# Patient Record
Sex: FEMALE | Race: WHITE | NOT HISPANIC OR LATINO | Employment: OTHER | URBAN - METROPOLITAN AREA
[De-identification: names, ages, dates, MRNs, and addresses within clinical notes are randomized per-mention and may not be internally consistent; named-entity substitution may affect disease eponyms.]

---

## 2017-02-02 ENCOUNTER — APPOINTMENT (OUTPATIENT)
Dept: PREADMISSION TESTING | Facility: HOSPITAL | Age: 82
End: 2017-02-02

## 2017-02-02 VITALS — WEIGHT: 162.04 LBS | HEIGHT: 62 IN | BODY MASS INDEX: 29.82 KG/M2

## 2017-02-02 LAB
ABO GROUP BLD: NORMAL
ANION GAP SERPL CALCULATED.3IONS-SCNC: 3 MMOL/L (ref 3–11)
BACTERIA UR QL AUTO: ABNORMAL /HPF
BILIRUB UR QL STRIP: NEGATIVE
BILIRUB UR QL STRIP: NEGATIVE
BLD GP AB SCN SERPL QL: NEGATIVE
BUN BLD-MCNC: 24 MG/DL (ref 9–23)
BUN/CREAT SERPL: 24 (ref 7–25)
CALCIUM SPEC-SCNC: 10.3 MG/DL (ref 8.7–10.4)
CHLORIDE SERPL-SCNC: 100 MMOL/L (ref 99–109)
CLARITY UR: CLEAR
CLARITY UR: CLEAR
CO2 SERPL-SCNC: 33 MMOL/L (ref 20–31)
COLOR UR: YELLOW
COLOR UR: YELLOW
CREAT BLD-MCNC: 1 MG/DL (ref 0.6–1.3)
DEPRECATED RDW RBC AUTO: 49.9 FL (ref 37–54)
ERYTHROCYTE [DISTWIDTH] IN BLOOD BY AUTOMATED COUNT: 13.9 % (ref 11.3–14.5)
GFR SERPL CREATININE-BSD FRML MDRD: 53 ML/MIN/1.73
GLUCOSE BLD-MCNC: 66 MG/DL (ref 70–100)
GLUCOSE UR STRIP-MCNC: NEGATIVE MG/DL
GLUCOSE UR STRIP-MCNC: NEGATIVE MG/DL
HBA1C MFR BLD: 5.9 % (ref 4.8–5.6)
HCT VFR BLD AUTO: 43.2 % (ref 34.5–44)
HGB BLD-MCNC: 14.2 G/DL (ref 11.5–15.5)
HGB UR QL STRIP.AUTO: NEGATIVE
HGB UR QL STRIP.AUTO: NEGATIVE
HYALINE CASTS UR QL AUTO: ABNORMAL /LPF
KETONES UR QL STRIP: NEGATIVE
KETONES UR QL STRIP: NEGATIVE
LEUKOCYTE ESTERASE UR QL STRIP.AUTO: ABNORMAL
LEUKOCYTE ESTERASE UR QL STRIP.AUTO: NEGATIVE
MCH RBC QN AUTO: 32.1 PG (ref 27–31)
MCHC RBC AUTO-ENTMCNC: 32.9 G/DL (ref 32–36)
MCV RBC AUTO: 97.7 FL (ref 80–99)
NITRITE UR QL STRIP: NEGATIVE
NITRITE UR QL STRIP: NEGATIVE
PH UR STRIP.AUTO: 5.5 [PH] (ref 5–8)
PH UR STRIP.AUTO: <=5 [PH] (ref 5–8)
PLATELET # BLD AUTO: 213 10*3/MM3 (ref 150–450)
PMV BLD AUTO: 10.5 FL (ref 6–12)
POTASSIUM BLD-SCNC: 4.6 MMOL/L (ref 3.5–5.5)
PROT UR QL STRIP: NEGATIVE
PROT UR QL STRIP: NEGATIVE
RBC # BLD AUTO: 4.42 10*6/MM3 (ref 3.89–5.14)
RBC # UR: ABNORMAL /HPF
REF LAB TEST METHOD: ABNORMAL
RH BLD: POSITIVE
SODIUM BLD-SCNC: 136 MMOL/L (ref 132–146)
SP GR UR STRIP: 1.01 (ref 1–1.03)
SP GR UR STRIP: 1.01 (ref 1–1.03)
SQUAMOUS #/AREA URNS HPF: ABNORMAL /HPF
UROBILINOGEN UR QL STRIP: ABNORMAL
UROBILINOGEN UR QL STRIP: NORMAL
WBC NRBC COR # BLD: 5.68 10*3/MM3 (ref 3.5–10.8)
WBC UR QL AUTO: ABNORMAL /HPF

## 2017-02-02 PROCEDURE — 86901 BLOOD TYPING SEROLOGIC RH(D): CPT

## 2017-02-02 PROCEDURE — 93010 ELECTROCARDIOGRAM REPORT: CPT | Performed by: INTERNAL MEDICINE

## 2017-02-02 PROCEDURE — 81001 URINALYSIS AUTO W/SCOPE: CPT | Performed by: ORTHOPAEDIC SURGERY

## 2017-02-02 PROCEDURE — 86900 BLOOD TYPING SEROLOGIC ABO: CPT

## 2017-02-02 PROCEDURE — 36415 COLL VENOUS BLD VENIPUNCTURE: CPT

## 2017-02-02 PROCEDURE — 83036 HEMOGLOBIN GLYCOSYLATED A1C: CPT | Performed by: ORTHOPAEDIC SURGERY

## 2017-02-02 PROCEDURE — 80048 BASIC METABOLIC PNL TOTAL CA: CPT | Performed by: ORTHOPAEDIC SURGERY

## 2017-02-02 PROCEDURE — 93005 ELECTROCARDIOGRAM TRACING: CPT

## 2017-02-02 PROCEDURE — 86850 RBC ANTIBODY SCREEN: CPT

## 2017-02-02 PROCEDURE — 81003 URINALYSIS AUTO W/O SCOPE: CPT | Performed by: ORTHOPAEDIC SURGERY

## 2017-02-02 PROCEDURE — 85027 COMPLETE CBC AUTOMATED: CPT | Performed by: ORTHOPAEDIC SURGERY

## 2017-02-02 RX ORDER — TOLTERODINE TARTRATE 2 MG/1
2 TABLET, EXTENDED RELEASE ORAL DAILY
COMMUNITY

## 2017-02-02 RX ORDER — LEVOTHYROXINE SODIUM 112 UG/1
112 CAPSULE ORAL DAILY
COMMUNITY

## 2017-02-02 RX ORDER — ATORVASTATIN CALCIUM 40 MG/1
40 TABLET, FILM COATED ORAL DAILY
COMMUNITY

## 2017-02-02 RX ORDER — NYSTATIN 100000 U/G
1 CREAM TOPICAL 3 TIMES DAILY
COMMUNITY

## 2017-02-02 RX ORDER — AMIODARONE HYDROCHLORIDE 200 MG/1
100 TABLET ORAL DAILY
COMMUNITY

## 2017-02-02 RX ORDER — OMEPRAZOLE 20 MG/1
20 CAPSULE, DELAYED RELEASE ORAL AS NEEDED
COMMUNITY

## 2017-02-02 RX ORDER — OMEGA-3S/DHA/EPA/FISH OIL/D3 300MG-1000
400 CAPSULE ORAL DAILY
COMMUNITY

## 2017-02-02 RX ORDER — DOXYCYCLINE HYCLATE 50 MG/1
50 CAPSULE ORAL DAILY
COMMUNITY
End: 2017-02-21 | Stop reason: HOSPADM

## 2017-02-02 RX ORDER — BRIMONIDINE TARTRATE 0.15 %
1 DROPS OPHTHALMIC (EYE) 3 TIMES DAILY
COMMUNITY

## 2017-02-02 RX ORDER — HYDROCODONE BITARTRATE AND ACETAMINOPHEN 10; 325 MG/1; MG/1
1 TABLET ORAL 2 TIMES DAILY PRN
Status: ON HOLD | COMMUNITY
End: 2017-02-19

## 2017-02-02 NOTE — PAT
Patient states that dr farrell is aware of doxycycline 50 mg use for eye blepharitis. Pt has been taking this for years.

## 2017-02-02 NOTE — PAT
DR. MEAD NOTIFIED OF ABNORMAL CCUA, ORDERED CATH UA WITH CULTURE IF +. ALSO NOTIFIED HIM OF SORE ON PATIENTS RIGHT FRONT LEG TREATED W RECENT ANTIBIOTICS, HEALING, NO FURTHER ORDERS.

## 2017-02-16 ENCOUNTER — ANESTHESIA EVENT (OUTPATIENT)
Dept: PERIOP | Facility: HOSPITAL | Age: 82
End: 2017-02-16

## 2017-02-16 RX ORDER — FAMOTIDINE 10 MG/ML
20 INJECTION, SOLUTION INTRAVENOUS ONCE
Status: CANCELLED | OUTPATIENT
Start: 2017-02-16 | End: 2017-02-16

## 2017-02-17 ENCOUNTER — ANESTHESIA (OUTPATIENT)
Dept: PERIOP | Facility: HOSPITAL | Age: 82
End: 2017-02-17

## 2017-02-17 ENCOUNTER — APPOINTMENT (OUTPATIENT)
Dept: GENERAL RADIOLOGY | Facility: HOSPITAL | Age: 82
End: 2017-02-17

## 2017-02-17 ENCOUNTER — HOSPITAL ENCOUNTER (INPATIENT)
Facility: HOSPITAL | Age: 82
LOS: 4 days | Discharge: NURSING, REHAB OR LONG-TERM CARE | End: 2017-02-21
Attending: ORTHOPAEDIC SURGERY | Admitting: ORTHOPAEDIC SURGERY

## 2017-02-17 DIAGNOSIS — Z74.09 IMPAIRED FUNCTIONAL MOBILITY, BALANCE, GAIT, AND ENDURANCE: Primary | ICD-10-CM

## 2017-02-17 DIAGNOSIS — T84.038A: ICD-10-CM

## 2017-02-17 DIAGNOSIS — Z96.659: ICD-10-CM

## 2017-02-17 PROBLEM — I10 HTN (HYPERTENSION): Status: ACTIVE | Noted: 2017-02-17

## 2017-02-17 PROBLEM — R73.03 PREDIABETES: Status: ACTIVE | Noted: 2017-02-17

## 2017-02-17 PROBLEM — I48.91 A-FIB (HCC): Status: ACTIVE | Noted: 2017-02-17

## 2017-02-17 PROBLEM — Z96.652 STATUS POST REVISION OF TOTAL REPLACEMENT OF LEFT KNEE: Status: ACTIVE | Noted: 2017-02-17

## 2017-02-17 LAB
ABO GROUP BLD: NORMAL
BLD GP AB SCN SERPL QL: NEGATIVE
GLUCOSE BLDC GLUCOMTR-MCNC: 150 MG/DL (ref 70–130)
GLUCOSE BLDC GLUCOMTR-MCNC: 219 MG/DL (ref 70–130)
POTASSIUM BLDA-SCNC: 3.92 MMOL/L (ref 3.5–5.3)
RH BLD: POSITIVE

## 2017-02-17 PROCEDURE — C1776 JOINT DEVICE (IMPLANTABLE): HCPCS | Performed by: ORTHOPAEDIC SURGERY

## 2017-02-17 PROCEDURE — 25010000002 ROPIVACAINE PER 1 MG: Performed by: ORTHOPAEDIC SURGERY

## 2017-02-17 PROCEDURE — 87205 SMEAR GRAM STAIN: CPT | Performed by: ORTHOPAEDIC SURGERY

## 2017-02-17 PROCEDURE — 0SRD0J9 REPLACEMENT OF LEFT KNEE JOINT WITH SYNTHETIC SUBSTITUTE, CEMENTED, OPEN APPROACH: ICD-10-PCS | Performed by: ORTHOPAEDIC SURGERY

## 2017-02-17 PROCEDURE — 0SPD0JZ REMOVAL OF SYNTHETIC SUBSTITUTE FROM LEFT KNEE JOINT, OPEN APPROACH: ICD-10-PCS | Performed by: ORTHOPAEDIC SURGERY

## 2017-02-17 PROCEDURE — 82962 GLUCOSE BLOOD TEST: CPT

## 2017-02-17 PROCEDURE — 63710000001 INSULIN LISPRO (HUMAN) PER 5 UNITS: Performed by: NURSE PRACTITIONER

## 2017-02-17 PROCEDURE — 86900 BLOOD TYPING SEROLOGIC ABO: CPT

## 2017-02-17 PROCEDURE — 25010000003 CEFAZOLIN IN DEXTROSE 2-4 GM/100ML-% SOLUTION: Performed by: ORTHOPAEDIC SURGERY

## 2017-02-17 PROCEDURE — 25010000002 FENTANYL CITRATE (PF) 100 MCG/2ML SOLUTION: Performed by: NURSE ANESTHETIST, CERTIFIED REGISTERED

## 2017-02-17 PROCEDURE — 88331 PATH CONSLTJ SURG 1 BLK 1SPC: CPT | Performed by: ORTHOPAEDIC SURGERY

## 2017-02-17 PROCEDURE — C1713 ANCHOR/SCREW BN/BN,TIS/BN: HCPCS | Performed by: ORTHOPAEDIC SURGERY

## 2017-02-17 PROCEDURE — 25010000002 PROPOFOL 10 MG/ML EMULSION: Performed by: NURSE ANESTHETIST, CERTIFIED REGISTERED

## 2017-02-17 PROCEDURE — 25010000002 ONDANSETRON PER 1 MG: Performed by: NURSE ANESTHETIST, CERTIFIED REGISTERED

## 2017-02-17 PROCEDURE — 25010000002 HYDRALAZINE PER 20 MG: Performed by: NURSE ANESTHETIST, CERTIFIED REGISTERED

## 2017-02-17 PROCEDURE — 84132 ASSAY OF SERUM POTASSIUM: CPT | Performed by: ANESTHESIOLOGY

## 2017-02-17 PROCEDURE — 25010000002 ROPIVACAINE PER 1 MG: Performed by: NURSE ANESTHETIST, CERTIFIED REGISTERED

## 2017-02-17 PROCEDURE — 25010000003 MORPHINE PER 10 MG: Performed by: ORTHOPAEDIC SURGERY

## 2017-02-17 PROCEDURE — 25010000002 HYDROMORPHONE PER 4 MG: Performed by: ORTHOPAEDIC SURGERY

## 2017-02-17 PROCEDURE — 25010000002 DEXAMETHASONE PER 1 MG: Performed by: NURSE ANESTHETIST, CERTIFIED REGISTERED

## 2017-02-17 PROCEDURE — 88304 TISSUE EXAM BY PATHOLOGIST: CPT | Performed by: ORTHOPAEDIC SURGERY

## 2017-02-17 PROCEDURE — 73560 X-RAY EXAM OF KNEE 1 OR 2: CPT

## 2017-02-17 PROCEDURE — 87070 CULTURE OTHR SPECIMN AEROBIC: CPT | Performed by: ORTHOPAEDIC SURGERY

## 2017-02-17 PROCEDURE — 86901 BLOOD TYPING SEROLOGIC RH(D): CPT

## 2017-02-17 PROCEDURE — 87176 TISSUE HOMOGENIZATION CULTR: CPT | Performed by: ORTHOPAEDIC SURGERY

## 2017-02-17 PROCEDURE — 86850 RBC ANTIBODY SCREEN: CPT

## 2017-02-17 DEVICE — P.F.C. SIGMA DISTAL AUGMENT SIZE 3 4MM LEFT
Type: IMPLANTABLE DEVICE | Site: KNEE | Status: FUNCTIONAL
Brand: P.F.C. SIGMA

## 2017-02-17 DEVICE — SIGMA TIBIAL INSERT ROTATING PLATFORM TC3 SIZE 3 17.5MM GVF
Type: IMPLANTABLE DEVICE | Site: KNEE | Status: FUNCTIONAL
Brand: SIGMA

## 2017-02-17 DEVICE — P.F.C. SIGMA FEMORAL ADAPTER BOLT +2/-2
Type: IMPLANTABLE DEVICE | Site: KNEE | Status: FUNCTIONAL
Brand: P.F.C. SIGMA

## 2017-02-17 DEVICE — P.F.C. SIGMA FEMORAL ADAPTER 5 DEGREE
Type: IMPLANTABLE DEVICE | Site: KNEE | Status: FUNCTIONAL
Brand: P.F.C. SIGMA

## 2017-02-17 DEVICE — IMPLANTABLE DEVICE: Type: IMPLANTABLE DEVICE | Site: KNEE | Status: FUNCTIONAL

## 2017-02-17 DEVICE — SMARTSET GMV HIGH PERFORMANCE GENTAMICIN MEDIUM VISCOSITY BONE CEMENT 40G
Type: IMPLANTABLE DEVICE | Site: KNEE | Status: FUNCTIONAL
Brand: SMARTSET

## 2017-02-17 DEVICE — TIBIAL TRAY ROTATING PLATFORM M.B.T. REVISION SIZE 2.5 CEMENTED: Type: IMPLANTABLE DEVICE | Site: KNEE | Status: FUNCTIONAL

## 2017-02-17 DEVICE — P.F.C. SIGMA POSTERIOR AUGMENT COMBO CEMENTED SIZE 3 4MM
Type: IMPLANTABLE DEVICE | Site: KNEE | Status: FUNCTIONAL
Brand: P.F.C. SIGMA

## 2017-02-17 DEVICE — UNIVERSAL STEM FLUTED 75MM X 16MM: Type: IMPLANTABLE DEVICE | Site: KNEE | Status: FUNCTIONAL

## 2017-02-17 DEVICE — UNIVERSAL FEMORAL SLEEVE FULL POROUS 34MM: Type: IMPLANTABLE DEVICE | Site: KNEE | Status: FUNCTIONAL

## 2017-02-17 DEVICE — UNIVERSAL STEM FLUTED 75MM X 18MM: Type: IMPLANTABLE DEVICE | Site: KNEE | Status: FUNCTIONAL

## 2017-02-17 DEVICE — M.B.T. REVISION METAPHYSEAL SLEEVE POROUS 37MM: Type: IMPLANTABLE DEVICE | Site: KNEE | Status: FUNCTIONAL

## 2017-02-17 DEVICE — SIGMA FEMORAL TC3 CEMENTED 3 LEFT
Type: IMPLANTABLE DEVICE | Site: KNEE | Status: FUNCTIONAL
Brand: SIGMA

## 2017-02-17 RX ORDER — ONDANSETRON 2 MG/ML
4 INJECTION INTRAMUSCULAR; INTRAVENOUS ONCE AS NEEDED
Status: DISCONTINUED | OUTPATIENT
Start: 2017-02-17 | End: 2017-02-17 | Stop reason: HOSPADM

## 2017-02-17 RX ORDER — ATORVASTATIN CALCIUM 40 MG/1
40 TABLET, FILM COATED ORAL DAILY
Status: DISCONTINUED | OUTPATIENT
Start: 2017-02-17 | End: 2017-02-21 | Stop reason: HOSPADM

## 2017-02-17 RX ORDER — POLYETHYLENE GLYCOL 3350 17 G/17G
17 POWDER, FOR SOLUTION ORAL DAILY
Status: DISCONTINUED | OUTPATIENT
Start: 2017-02-17 | End: 2017-02-21 | Stop reason: HOSPADM

## 2017-02-17 RX ORDER — LEVOTHYROXINE SODIUM 112 UG/1
112 TABLET ORAL
Status: DISCONTINUED | OUTPATIENT
Start: 2017-02-18 | End: 2017-02-21 | Stop reason: HOSPADM

## 2017-02-17 RX ORDER — BUPIVACAINE HYDROCHLORIDE 2.5 MG/ML
INJECTION, SOLUTION EPIDURAL; INFILTRATION; INTRACAUDAL AS NEEDED
Status: DISCONTINUED | OUTPATIENT
Start: 2017-02-17 | End: 2017-02-17 | Stop reason: SURG

## 2017-02-17 RX ORDER — LIDOCAINE HYDROCHLORIDE 10 MG/ML
1 INJECTION, SOLUTION EPIDURAL; INFILTRATION; INTRACAUDAL; PERINEURAL ONCE
Status: COMPLETED | OUTPATIENT
Start: 2017-02-17 | End: 2017-02-17

## 2017-02-17 RX ORDER — CEFAZOLIN SODIUM 2 G/100ML
2 INJECTION, SOLUTION INTRAVENOUS ONCE
Status: COMPLETED | OUTPATIENT
Start: 2017-02-17 | End: 2017-02-17

## 2017-02-17 RX ORDER — LIDOCAINE HYDROCHLORIDE 10 MG/ML
INJECTION, SOLUTION INFILTRATION; PERINEURAL AS NEEDED
Status: DISCONTINUED | OUTPATIENT
Start: 2017-02-17 | End: 2017-02-17 | Stop reason: SURG

## 2017-02-17 RX ORDER — ACETAMINOPHEN 500 MG
1000 TABLET ORAL ONCE
Status: COMPLETED | OUTPATIENT
Start: 2017-02-17 | End: 2017-02-17

## 2017-02-17 RX ORDER — AMIODARONE HYDROCHLORIDE 200 MG/1
100 TABLET ORAL DAILY
Status: DISCONTINUED | OUTPATIENT
Start: 2017-02-17 | End: 2017-02-21 | Stop reason: HOSPADM

## 2017-02-17 RX ORDER — MAGNESIUM HYDROXIDE 1200 MG/15ML
LIQUID ORAL AS NEEDED
Status: DISCONTINUED | OUTPATIENT
Start: 2017-02-17 | End: 2017-02-17 | Stop reason: HOSPADM

## 2017-02-17 RX ORDER — ONDANSETRON 2 MG/ML
4 INJECTION INTRAMUSCULAR; INTRAVENOUS EVERY 6 HOURS PRN
Status: DISCONTINUED | OUTPATIENT
Start: 2017-02-17 | End: 2017-02-17

## 2017-02-17 RX ORDER — PROPOFOL 10 MG/ML
VIAL (ML) INTRAVENOUS CONTINUOUS PRN
Status: DISCONTINUED | OUTPATIENT
Start: 2017-02-17 | End: 2017-02-17 | Stop reason: SURG

## 2017-02-17 RX ORDER — FENTANYL CITRATE 50 UG/ML
50 INJECTION, SOLUTION INTRAMUSCULAR; INTRAVENOUS
Status: DISCONTINUED | OUTPATIENT
Start: 2017-02-17 | End: 2017-02-17 | Stop reason: HOSPADM

## 2017-02-17 RX ORDER — ONDANSETRON 4 MG/1
4 TABLET, FILM COATED ORAL EVERY 6 HOURS PRN
Status: DISCONTINUED | OUTPATIENT
Start: 2017-02-17 | End: 2017-02-17

## 2017-02-17 RX ORDER — OXYBUTYNIN CHLORIDE 5 MG/1
5 TABLET, EXTENDED RELEASE ORAL DAILY
Status: DISCONTINUED | OUTPATIENT
Start: 2017-02-17 | End: 2017-02-21 | Stop reason: HOSPADM

## 2017-02-17 RX ORDER — ROPIVACAINE HYDROCHLORIDE 2 MG/ML
10 INJECTION, SOLUTION EPIDURAL; INFILTRATION CONTINUOUS
Status: DISCONTINUED | OUTPATIENT
Start: 2017-02-17 | End: 2017-02-21 | Stop reason: HOSPADM

## 2017-02-17 RX ORDER — SODIUM CHLORIDE 0.9 % (FLUSH) 0.9 %
1-10 SYRINGE (ML) INJECTION AS NEEDED
Status: DISCONTINUED | OUTPATIENT
Start: 2017-02-17 | End: 2017-02-17 | Stop reason: HOSPADM

## 2017-02-17 RX ORDER — DEXAMETHASONE SODIUM PHOSPHATE 4 MG/ML
INJECTION, SOLUTION INTRA-ARTICULAR; INTRALESIONAL; INTRAMUSCULAR; INTRAVENOUS; SOFT TISSUE AS NEEDED
Status: DISCONTINUED | OUTPATIENT
Start: 2017-02-17 | End: 2017-02-17 | Stop reason: SURG

## 2017-02-17 RX ORDER — SENNA AND DOCUSATE SODIUM 50; 8.6 MG/1; MG/1
2 TABLET, FILM COATED ORAL 2 TIMES DAILY
Status: DISCONTINUED | OUTPATIENT
Start: 2017-02-17 | End: 2017-02-21 | Stop reason: HOSPADM

## 2017-02-17 RX ORDER — CEFAZOLIN SODIUM 2 G/100ML
2 INJECTION, SOLUTION INTRAVENOUS EVERY 8 HOURS
Status: COMPLETED | OUTPATIENT
Start: 2017-02-17 | End: 2017-02-18

## 2017-02-17 RX ORDER — DEXTROSE MONOHYDRATE 25 G/50ML
25 INJECTION, SOLUTION INTRAVENOUS
Status: DISCONTINUED | OUTPATIENT
Start: 2017-02-17 | End: 2017-02-21 | Stop reason: HOSPADM

## 2017-02-17 RX ORDER — PROPOFOL 10 MG/ML
VIAL (ML) INTRAVENOUS AS NEEDED
Status: DISCONTINUED | OUTPATIENT
Start: 2017-02-17 | End: 2017-02-17 | Stop reason: SURG

## 2017-02-17 RX ORDER — SODIUM CHLORIDE 9 MG/ML
100 INJECTION, SOLUTION INTRAVENOUS CONTINUOUS
Status: ACTIVE | OUTPATIENT
Start: 2017-02-17 | End: 2017-02-18

## 2017-02-17 RX ORDER — PROMETHAZINE HYDROCHLORIDE 25 MG/ML
12.5 INJECTION, SOLUTION INTRAMUSCULAR; INTRAVENOUS EVERY 6 HOURS PRN
Status: DISCONTINUED | OUTPATIENT
Start: 2017-02-17 | End: 2017-02-21 | Stop reason: HOSPADM

## 2017-02-17 RX ORDER — HYDRALAZINE HYDROCHLORIDE 20 MG/ML
INJECTION INTRAMUSCULAR; INTRAVENOUS AS NEEDED
Status: DISCONTINUED | OUTPATIENT
Start: 2017-02-17 | End: 2017-02-17 | Stop reason: SURG

## 2017-02-17 RX ORDER — LABETALOL HYDROCHLORIDE 5 MG/ML
INJECTION, SOLUTION INTRAVENOUS AS NEEDED
Status: DISCONTINUED | OUTPATIENT
Start: 2017-02-17 | End: 2017-02-17 | Stop reason: SURG

## 2017-02-17 RX ORDER — NALOXONE HCL 0.4 MG/ML
0.1 VIAL (ML) INJECTION
Status: DISCONTINUED | OUTPATIENT
Start: 2017-02-17 | End: 2017-02-21 | Stop reason: HOSPADM

## 2017-02-17 RX ORDER — ATRACURIUM BESYLATE 10 MG/ML
INJECTION, SOLUTION INTRAVENOUS AS NEEDED
Status: DISCONTINUED | OUTPATIENT
Start: 2017-02-17 | End: 2017-02-17 | Stop reason: SURG

## 2017-02-17 RX ORDER — FAMOTIDINE 20 MG/1
20 TABLET, FILM COATED ORAL ONCE
Status: COMPLETED | OUTPATIENT
Start: 2017-02-17 | End: 2017-02-17

## 2017-02-17 RX ORDER — ESMOLOL HYDROCHLORIDE 10 MG/ML
INJECTION INTRAVENOUS AS NEEDED
Status: DISCONTINUED | OUTPATIENT
Start: 2017-02-17 | End: 2017-02-17 | Stop reason: SURG

## 2017-02-17 RX ORDER — FENTANYL CITRATE 50 UG/ML
INJECTION, SOLUTION INTRAMUSCULAR; INTRAVENOUS AS NEEDED
Status: DISCONTINUED | OUTPATIENT
Start: 2017-02-17 | End: 2017-02-17 | Stop reason: SURG

## 2017-02-17 RX ORDER — HYDROCODONE BITARTRATE AND ACETAMINOPHEN 7.5; 325 MG/1; MG/1
1 TABLET ORAL EVERY 4 HOURS PRN
Status: DISCONTINUED | OUTPATIENT
Start: 2017-02-17 | End: 2017-02-21 | Stop reason: HOSPADM

## 2017-02-17 RX ORDER — SODIUM CHLORIDE, SODIUM LACTATE, POTASSIUM CHLORIDE, CALCIUM CHLORIDE 600; 310; 30; 20 MG/100ML; MG/100ML; MG/100ML; MG/100ML
9 INJECTION, SOLUTION INTRAVENOUS CONTINUOUS
Status: DISCONTINUED | OUTPATIENT
Start: 2017-02-17 | End: 2017-02-21 | Stop reason: HOSPADM

## 2017-02-17 RX ORDER — NICOTINE POLACRILEX 4 MG
15 LOZENGE BUCCAL
Status: DISCONTINUED | OUTPATIENT
Start: 2017-02-17 | End: 2017-02-21 | Stop reason: HOSPADM

## 2017-02-17 RX ORDER — PANTOPRAZOLE SODIUM 40 MG/1
40 TABLET, DELAYED RELEASE ORAL
Status: DISCONTINUED | OUTPATIENT
Start: 2017-02-18 | End: 2017-02-21 | Stop reason: HOSPADM

## 2017-02-17 RX ORDER — ONDANSETRON 2 MG/ML
INJECTION INTRAMUSCULAR; INTRAVENOUS AS NEEDED
Status: DISCONTINUED | OUTPATIENT
Start: 2017-02-17 | End: 2017-02-17 | Stop reason: SURG

## 2017-02-17 RX ORDER — PREGABALIN 75 MG/1
75 CAPSULE ORAL ONCE
Status: COMPLETED | OUTPATIENT
Start: 2017-02-17 | End: 2017-02-17

## 2017-02-17 RX ADMIN — LIDOCAINE HYDROCHLORIDE 0.4 ML: 10 INJECTION, SOLUTION EPIDURAL; INFILTRATION; INTRACAUDAL; PERINEURAL at 10:24

## 2017-02-17 RX ADMIN — HYDRALAZINE HYDROCHLORIDE 2 MG: 20 INJECTION INTRAMUSCULAR; INTRAVENOUS at 12:12

## 2017-02-17 RX ADMIN — LABETALOL HYDROCHLORIDE 5 MG: 5 INJECTION, SOLUTION INTRAVENOUS at 11:45

## 2017-02-17 RX ADMIN — BUPIVACAINE HYDROCHLORIDE 15 ML: 2.5 INJECTION, SOLUTION EPIDURAL; INFILTRATION; INTRACAUDAL; PERINEURAL at 15:56

## 2017-02-17 RX ADMIN — SODIUM CHLORIDE 100 ML/HR: 9 INJECTION, SOLUTION INTRAVENOUS at 18:16

## 2017-02-17 RX ADMIN — FENTANYL CITRATE 50 MCG: 50 INJECTION, SOLUTION INTRAMUSCULAR; INTRAVENOUS at 12:03

## 2017-02-17 RX ADMIN — LIDOCAINE HYDROCHLORIDE 50 MG: 10 INJECTION, SOLUTION INFILTRATION; PERINEURAL at 10:53

## 2017-02-17 RX ADMIN — INSULIN LISPRO 3 UNITS: 100 INJECTION, SOLUTION INTRAVENOUS; SUBCUTANEOUS at 21:29

## 2017-02-17 RX ADMIN — ATRACURIUM BESYLATE 40 MG: 10 INJECTION, SOLUTION INTRAVENOUS at 10:53

## 2017-02-17 RX ADMIN — ROPIVACAINE HYDROCHLORIDE 10 ML/HR: 2 INJECTION, SOLUTION EPIDURAL; INFILTRATION at 16:23

## 2017-02-17 RX ADMIN — ESMOLOL HYDROCHLORIDE 30 MG: 10 INJECTION, SOLUTION INTRAVENOUS at 10:53

## 2017-02-17 RX ADMIN — PROPOFOL 100 MG: 10 INJECTION, EMULSION INTRAVENOUS at 10:53

## 2017-02-17 RX ADMIN — ONDANSETRON 4 MG: 2 INJECTION INTRAMUSCULAR; INTRAVENOUS at 15:11

## 2017-02-17 RX ADMIN — HYDROCODONE BITARTRATE AND ACETAMINOPHEN 1 TABLET: 7.5; 325 TABLET ORAL at 20:52

## 2017-02-17 RX ADMIN — SODIUM CHLORIDE, POTASSIUM CHLORIDE, SODIUM LACTATE AND CALCIUM CHLORIDE 9 ML/HR: 600; 310; 30; 20 INJECTION, SOLUTION INTRAVENOUS at 10:01

## 2017-02-17 RX ADMIN — EPHEDRINE SULFATE 10 MG: 50 INJECTION INTRAMUSCULAR; INTRAVENOUS; SUBCUTANEOUS at 14:00

## 2017-02-17 RX ADMIN — PREGABALIN 75 MG: 75 CAPSULE ORAL at 10:23

## 2017-02-17 RX ADMIN — EPHEDRINE SULFATE 10 MG: 50 INJECTION INTRAMUSCULAR; INTRAVENOUS; SUBCUTANEOUS at 11:05

## 2017-02-17 RX ADMIN — CEFAZOLIN SODIUM 2 G: 2 INJECTION, SOLUTION INTRAVENOUS at 10:51

## 2017-02-17 RX ADMIN — TRANEXAMIC ACID 735 MG: 100 INJECTION, SOLUTION INTRAVENOUS at 11:30

## 2017-02-17 RX ADMIN — ACETAMINOPHEN 1000 MG: 500 TABLET, FILM COATED ORAL at 10:24

## 2017-02-17 RX ADMIN — EPHEDRINE SULFATE 5 MG: 50 INJECTION INTRAMUSCULAR; INTRAVENOUS; SUBCUTANEOUS at 11:10

## 2017-02-17 RX ADMIN — PROPOFOL 25 MCG/KG/MIN: 10 INJECTION, EMULSION INTRAVENOUS at 10:53

## 2017-02-17 RX ADMIN — LABETALOL HYDROCHLORIDE 5 MG: 5 INJECTION, SOLUTION INTRAVENOUS at 11:51

## 2017-02-17 RX ADMIN — FENTANYL CITRATE 50 MCG: 50 INJECTION, SOLUTION INTRAMUSCULAR; INTRAVENOUS at 11:39

## 2017-02-17 RX ADMIN — HYDROMORPHONE HYDROCHLORIDE 0.5 MG: 1 INJECTION, SOLUTION INTRAMUSCULAR; INTRAVENOUS; SUBCUTANEOUS at 21:17

## 2017-02-17 RX ADMIN — BUPIVACAINE HYDROCHLORIDE 15 ML: 2.5 INJECTION, SOLUTION EPIDURAL; INFILTRATION; INTRACAUDAL; PERINEURAL at 10:55

## 2017-02-17 RX ADMIN — FAMOTIDINE 20 MG: 20 TABLET ORAL at 10:23

## 2017-02-17 RX ADMIN — TRANEXAMIC ACID 735 MG: 100 INJECTION, SOLUTION INTRAVENOUS at 14:45

## 2017-02-17 RX ADMIN — EPHEDRINE SULFATE 10 MG: 50 INJECTION INTRAMUSCULAR; INTRAVENOUS; SUBCUTANEOUS at 13:46

## 2017-02-17 RX ADMIN — CEFAZOLIN SODIUM 2 G: 2 INJECTION, SOLUTION INTRAVENOUS at 21:17

## 2017-02-17 RX ADMIN — LABETALOL HYDROCHLORIDE 5 MG: 5 INJECTION, SOLUTION INTRAVENOUS at 12:03

## 2017-02-17 RX ADMIN — DEXAMETHASONE SODIUM PHOSPHATE 8 MG: 4 INJECTION, SOLUTION INTRAMUSCULAR; INTRAVENOUS at 10:53

## 2017-02-17 NOTE — ANESTHESIA PROCEDURE NOTES
Airway  Urgency: elective    Airway not difficult    General Information and Staff    Patient location during procedure: OR  CRNA: DOC FOOTE    Indications and Patient Condition  Indications for airway management: airway protection    Preoxygenated: yes  MILS not maintained throughout  Mask difficulty assessment: 1 - vent by mask    Final Airway Details  Final airway type: endotracheal airway      Successful airway: ETT  Cuffed: yes   Successful intubation technique: direct laryngoscopy  Endotracheal tube insertion site: oral  Blade: Neha  Blade size: #3  ETT size: 6.5 mm  Cormack-Lehane Classification: grade I - full view of glottis  Placement verified by: chest auscultation and capnometry   Cuff volume (mL): 6  Measured from: lips  ETT to lips (cm): 20  Number of attempts at approach: 1    Additional Comments  Negative epigastric sounds, Breath sound equal bilaterally with symmetric chest rise and fall. Atraumatic, no damage to lips or teeth during intubation

## 2017-02-17 NOTE — OP NOTE
DATE OF PROCEDURE:  02/17/2017    PREOPERATIVE DIAGNOSIS: Failed left total knee arthroplasty, loose tibial component.    POSTOPERATIVE DIAGNOSIS: Failed left total knee arthroplasty, loose tibial component.    PROCEDURE PERFORMED: Revision of left total knee arthroplasty, both components.     SURGEON: Simon Colvin MD    ANESTHESIA: General.     ASSISTANT: YUE Quezada    ESTIMATED BLOOD LOSS: 100 mL.     COMPLICATIONS: None.     SPECIMENS: Tissue was sent for frozen section, which showed no evidence of acute inflammation. Tissue was sent for culture as well.     INDICATIONS: The patient is an 84-year-old female with a long history of left knee pain and functional limitations. She is status post left total knee arthroplasty greater than 10 years ago in [place]. X-rays showed evidence of a subsided and loose tibial component. She had varus and valgus instability on exam. I had recommended revision many years ago, but she had not elected to pursue it. She reached the point where she was having persistent pain in the knee and difficulty functioning secondary to the knee pain and instability. At that point, she elected to pursue revision. Risks and benefits of the procedure were discussed with the patient including infection, fracture, neurovascular injury, DVT, PE, stiffness, need for manipulation, revision, or other anesthesia complications. The patient elected to proceed.     DESCRIPTION OF PROCEDURE: After smooth induction of general anesthesia, the patient was positioned supine on the table. The left lower extremity was prepped and draped in the usual sterile fashion for a left knee procedure. A timeout was initiated to verify the patient and the operative limb. Her previous incision was utilized. Dissection was carried down to the level of the capsule and fascia. The patellar tendon and outline of the patella were identified. A medial parapatellar arthrotomy was performed. There was massive synovitis  and debris in the knee joint. A full synovectomy was performed. The tibia was grossly loose. The femur was well fixed. I removed the poly. I then cleared off the cement line for the femur. A handpiece ACL blade was used to cut the femoral cement mantle. The femoral component was then tapped off with minimal disruption of the bone. The tibia lifted right out of the cement base. I then cleaned up the posterior knee and cleaned up all of the femoral bony surfaces and opened up the canal again. Attention was then turned to the proximal tibia. I made a transverse cut to remove most of the cement. There was significant flexion deformity in the tibia with the posterior defect. I removed all of the cement including cement down to the canal. I then reamed the canal up to a size 16 and then broached with sleeves. The 2nd size sleeve had excellent purchase in the proximal tibia. I then recut the tibia to match the sleeve and stem. I then cleaned out all of the posterior tibia. There was significant defect in the back greater than 1 cm. The sleeve and stem; however, had excellent purchase. Attention was then turned to the femur. I used spacer blocks to evaluate the flexion and extension gaps, which were found to be asymmetrical by about 3 mm. I reamed the femur up to size 18 which had stable position. I then placed a sleeve into the proximal femur at the level of the TC3. I then took about a millimeter off of the distal femur to get reasonable bone. I then built 4 mm augments and used spacer blocks to set the rotation of the femoral component. I then completed the posterior chamfer cuts. This created a stable flexion-extension gap for 17.5 spacer. I upsized the sleeve by 1 to set the new sleeve at the level of the distal augments. At this point, I had reached 2 hours on the tourniquet. The knee was brought to full extension and wrapped in an Ace wrap with a dressing and the tourniquet was released for 15 minutes. During that  time, I assembled the final components on the back table. I then reinflated the tourniquet after exsanguination. I irrigated the knee, removed the components and irrigated the bony surfaces. I placed the broach back in the tibia and then bone grafted the posterior half with bone graft from the notch and then cancellous chips. I then tamped this down with the tibial tray. I then mixed gentamicin laden cement and placed cement at the interface and impacted the tibia seating the stem and the sleeve. I then held it in place while the cement hardened taking care to not allow significant posterior extrusion. After the cement had hardened and I was satisfied with the tibial position, the femur was irrigated, cleaned and cemented as well. Both components sat at the same level and same rotation as their trials. The 17.5 poly was placed and the knee was brought to full extension. After the cement had hardened, I inspected the knee. The patient had full range of motion from 0° to 130° with no significant varus or valgus instability. The patella tracked midline as well. The knee was brought to full extension. The knee was irrigated and drained. Hemostasis was achieved with a Bovie. The capsule was repaired with StrataFix suture. The skin was closed with Vicryl, followed by mesh and glue. A sterile dressing was applied. The patient was awakened and transferred to the PACU.    IMPLANTS: [employer] MBT Revision System. Tibia was a size 2.5 MBT tray with a metaphyseal sleeve and the shortest 16 mm stem. The femur was a size 3 femur with the shortest 18 mm stem and the 2nd metaphyseal sleeve. I used two 4 mm distal augments and one 4 mm lateral posterior augment. The patella was a MBT revision 17.5.       MD SUSHMA Castro/richard  DD: 02/17/2017 15:06:53  DT: 02/17/2017 18:06:53  Voice Rec. ID #[id number]  Voice Original ID #[id number]  Doc ID #[id number]  Rev. #0  cc:

## 2017-02-17 NOTE — ANESTHESIA POSTPROCEDURE EVALUATION
Patient: Radha Ruggiero    Procedure Summary     Date Anesthesia Start Anesthesia Stop Room / Location    02/17/17 1051 1554 [facility] / [facility]       Procedure Diagnosis Surgeon Provider    REVISION LEFT TOTAL KNEE ARTHROPLASTY, BOTH COMPONENTS (Left Knee) No diagnosis on file. MD Dayo Cardoza MD          Anesthesia Type: general  Last vitals  /58 (02/17/17 1539)    Temp 98.1 °F (36.7 °C) (02/17/17 1539)    Pulse 59 (02/17/17 1539)   Resp 16 (02/17/17 1539)    SpO2 97 % (02/17/17 1539)      Post Anesthesia Care and Evaluation    Patient location during evaluation: PACU  Patient participation: complete - patient participated  Level of consciousness: awake and alert  Pain score: 0  Pain management: adequate  Airway patency: patent  Anesthetic complications: No anesthetic complications  PONV Status: none  Cardiovascular status: hemodynamically stable and acceptable  Respiratory status: nonlabored ventilation, acceptable and nasal cannula  Hydration status: acceptable

## 2017-02-17 NOTE — ANESTHESIA PREPROCEDURE EVALUATION
Anesthesia Evaluation     Patient summary reviewed and Nursing notes reviewed   no history of anesthetic complications:  NPO Status: > 8 hours   Airway   Mallampati: II  TM distance: >3 FB  Neck ROM: full  no difficulty expected  Dental - normal exam     Pulmonary - negative pulmonary ROS and normal exam    breath sounds clear to auscultation  Cardiovascular - normal exam    ECG reviewed  PT is on anticoagulation therapy  Rhythm: regular  Rate: normal    (+) hypertension, dysrhythmias (On eliquis, off since tuesday night) Atrial Fib,       Neuro/Psych- negative ROS  GI/Hepatic/Renal/Endo    (+)  GERD,     Musculoskeletal     (+) back pain (h/o multiple vertebral fractures),   Abdominal    Substance History - negative use     OB/GYN      Comment: Breast ca s/p lumpectomy      Other   (+) arthritis                                 Anesthesia Plan    ASA 3     general   (Adductor canal block post-induction for post-operative analgesia per request of Dr. Colvin)  intravenous induction   Anesthetic plan and risks discussed with patient.    Plan discussed with CRNA.
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
## 2017-02-17 NOTE — BRIEF OP NOTE
TOTAL KNEE ARTHROPLASTY REVISION  Procedure Note    Radha Ruggiero  2/17/2017    Pre-op Diagnosis:   * No pre-op diagnosis entered *    Post-op Diagnosis:     * No Diagnosis Codes entered *    Procedure/CPT® Codes:      Procedure(s):  REVISION LEFT TOTAL KNEE ARTHROPLASTY, BOTH COMPONENTS    Surgeon(s):  Simon Colvin MD    Anesthesia: General with Block    Staff:   Circulator: Gabrielle Esparza RN; Marilu Coleman, RN; Lou Cameron RN  Scrub Person: Thor Chaudhari; Enio Kong  Vendor Representative: Brandon Toussaint  Nursing Assistant: Arias Irizarry; Yeny Thakur  Assistant: YUE Parham    Estimated Blood Loss: * No values recorded between 2/17/2017 10:49 AM and 2/17/2017  3:00 PM *    Specimens:                  ID Type Source Tests Collected by Time Destination   A : SYNOVIUM OF LEFT KNEE Synovium Knee, Left TISSUE CULTURE, TISSUE EXAM Simon Colvin MD 2/17/2017 1132    B :  Synovium Knee, Left TISSUE EXAM Simon Colvin MD 2/17/2017 1135          Drains:           Findings: loose tibial component with subsidence    Complications: none      Simon Colvin MD     Date: 2/17/2017  Time: 3:00 PM

## 2017-02-17 NOTE — ANESTHESIA PROCEDURE NOTES
Peripheral Block    Patient location during procedure: post-op  Start time: 2/17/2017 3:40 PM  Stop time: 2/17/2017 3:52 PM  Reason for block: at surgeon's request and post-op pain management  Performed by  CRNA: DOC FOOTE  Preanesthetic Checklist  Completed: patient identified, site marked, surgical consent, pre-op evaluation, timeout performed, IV checked, risks and benefits discussed and monitors and equipment checked  Peripheral Block Prep:  Sterile barriers:cap, gloves, mask and sterile barriers  Prep: ChloraPrep  Patient monitoring: blood pressure monitoring, continuous pulse oximetry and EKG  Peripheral Procedure  Sedation:no  Guidance:ultrasound guided  Images:still images obtained  Laterality:leftBlock Type:adductor canal block  Injection Technique:catheterNeedle Type:echogenic  Needle Gauge:18 G  Catheter Size:20 G (20g)  Medications  Preservative Free Saline:5ml  Local Injected:bupivacaine 0.25% without epinephrine Local Amount Injected:15 (ml)mL  Post Assessment  Patient Tolerance:comfortable throughout block  Complications:no  Additional Notes  Procedure:           CATHETER at skin: 14                                 Analgesia was achieved with 1% Lidocaine 2 ml for infiltration of skin       The pt was placed in the Supine position.  The Insertion site was  prepped and Draped in sterile fashion.  A BBraun 4 inch 18g echogenic needle was then  inserted approximately midline, mid-thigh and advanced In-plane with Ultrasound guidance.  Normal Saline PSF was utilized for hydrodissection of tissue.  The Vastus medialis and Sartorius muscle where visualized and the needle tip was placed in the adductor canal,  lateral to the femoral artery.  LA injection spread was visualized, injection was incremental 1-5ml, injection pressure was normal or little, no intraneural injection, no vascular injection.  LA dose was injected thru the needle(see dose above).  A BBraun 20g wire stylet catheter was placed via  the needle with ultrasound visualization and confirmation with NS fluid bolus. The labeled Catheter was then secured to skin at insertion site with skin afix and steristrips to curled catheter and CHG transparent dressing.  Thank you.

## 2017-02-17 NOTE — H&P
Patient Name: Radha Ruggiero  MRN: [id number]  : 7/3/1932  DOS: 2017    Attending: Simon Colvin MD    Primary Care Provider: Anuel Amador MD      Chief complaint:  Left knee pain    Subjective   Patient is a 84 y.o. female presented for revision of left total knee arthroplasty by Dr. Colvin under GA. She tolerated surgery well and is admitted for further medical management.    When seen in PACU she is sedated. She appears comfortable. Anesthesia at bedside for ACB placement.     Seen later in her room, she is doing well . LEs still with effects of spinal anesthesia. No f/c/n/vom/sob. Starting to eat dinner.wy    Allergies:  Allergies   Allergen Reactions   • Other Arrhythmia     anzamet     Life threatening high bp and rapid pulse       Meds:  Prescriptions Prior to Admission   Medication Sig Dispense Refill Last Dose   • amiodarone (PACERONE) 200 MG tablet Take 100 mg by mouth Daily. With largest meal of the day   2017 at 0700   • atorvastatin (LIPITOR) 40 MG tablet Take 40 mg by mouth Daily.   2017 at    • brimonidine (ALPHAGAN) 0.15 % ophthalmic solution Administer 1 drop to both eyes 3 (Three) Times a Day.   2017 at 0700   • cholecalciferol (VITAMIN D3) 400 UNITS tablet Take 400 Units by mouth Daily.   2017 at 0700   • doxycycline (VIBRAMYCIN) 50 MG capsule Take 50 mg by mouth Daily.   2017 at    • HYDROcodone-acetaminophen (NORCO)  MG per tablet Take 1 tablet by mouth 2 (Two) Times a Day As Needed for moderate pain (4-6).   2017 at 0800   • levothyroxine sodium (TIROSINT) 112 MCG capsule Take 112 mcg by mouth Daily.   2017 at 0700   • nystatin (MYCOSTATIN) 053473 UNIT/GM cream Apply 1 application topically 3 (Three) Times a Day.   2017 at 1000   • polyethylene glycol pack Take  by mouth Daily. 2 teaspoon   2017 at 1400   • sertraline (ZOLOFT) 50 MG tablet Take 50 mg by mouth Daily.   2017 at    • tolterodine  "(DETROL) 2 MG tablet Take 2 mg by mouth Daily.   2/17/2017 at 0700   • Wheat Dextrin (EQ FIBER POWDER PO) Take  by mouth 2 (Two) Times a Day. 2 teaspoons   2/17/2017 at 0700   • apixaban (ELIQUIS) 5 MG tablet tablet Take 5 mg by mouth 2 (Two) Times a Day. Instructed to stop 2 days before surgery per cardiologist   2/14/2017   • omeprazole (priLOSEC) 20 MG capsule Take 20 mg by mouth As Needed.   1 week ago       History:   Past Medical History   Diagnosis Date   • Arthritis    • Atrial fibrillation    • Back pain    • Blepharitis, both eyes    • Cancer      left breast   • Glaucoma      right eye   • Hypertension    • PONV (postoperative nausea and vomiting)      TARA WITH MORPHINE   • Wears glasses      Past Surgical History   Procedure Laterality Date   • Breast lumpectomy       left   • Eye surgery     • Total knee arthroplasty Bilateral    • Shoulder arthroscopy       X2   • Kuhn arthroplasty     • Foot surgery Bilateral      BUNIONECTOMY, HAMMER TOE     History reviewed. No pertinent family history.  Social History   Substance Use Topics   • Smoking status: Never Smoker   • Smokeless tobacco: Never Used   • Alcohol use No       Review of Systems  Pertinent items are noted in HPI, all other systems reviewed and negative    Vital Signs  Visit Vitals   • /58 (BP Location: Right arm, Patient Position: Lying)   • Pulse 59   • Temp 98.1 °F (36.7 °C) (Temporal Artery )   • Resp 16   • Ht 62\" (157.5 cm)   • Wt 162 lb (73.5 kg)   • SpO2 97%   • BMI 29.63 kg/m2       Physical Exam:    General Appearance:    Sedated, in no acute distress   Head:    Normocephalic, without obvious abnormality, atraumatic   Eyes:            Lids and lashes normal, conjunctivae and sclerae normal, no   icterus, no pallor, corneas clear, PERRLA   Ears:    Ears appear intact with no abnormalities noted   Throat:   No oral lesions, no thrush, oral mucosa moist   Neck:   No adenopathy, supple, trachea midline, no thyromegaly, no     " carotid bruit, no JVD   Lungs:     Clear to auscultation,respirations regular, even and                   unlabored    Heart:    Regular rhythm and normal rate, normal S1 and S2, 2/6            murmur, no gallop, no rub, no click   Abdomen:     Normal bowel sounds, no masses, no organomegaly, soft        non-tender, non-distended, no guarding, no rebound                 tenderness   Genitalia:    Deferred   Extremities:   Left knee with Covaderm CDI. Nerve block present   Pulses:   Pulses palpable and equal bilaterally   Skin:   No bleeding, bruising or rash      I reviewed the patient's new clinical results.     Results for RIKY MCDANIELS (MRN [id number]) as of 2/17/2017 14:05   Ref. Range 2/2/2017 12:16   Glucose Latest Ref Range: 70 - 100 mg/dL 66 (L)   Sodium Latest Ref Range: 132 - 146 mmol/L 136   Potassium Latest Ref Range: 3.5 - 5.5 mmol/L 4.6   CO2 Latest Ref Range: 20.0 - 31.0 mmol/L 33.0 (H)   Chloride Latest Ref Range: 99 - 109 mmol/L 100   Anion Gap Latest Ref Range: 3.0 - 11.0 mmol/L 3.0   Creatinine Latest Ref Range: 0.60 - 1.30 mg/dL 1.00   BUN Latest Ref Range: 9 - 23 mg/dL 24 (H)   BUN/Creatinine Ratio Latest Ref Range: 7.0 - 25.0  24.0   Calcium Latest Ref Range: 8.7 - 10.4 mg/dL 10.3   eGFR Non  Amer Latest Ref Range: >60 mL/min/1.73 53 (L)   Hemoglobin A1C Latest Ref Range: 4.80 - 5.60 % 5.90 (H)   WBC Latest Ref Range: 3.50 - 10.80 10*3/mm3 5.68   RBC Latest Ref Range: 3.89 - 5.14 10*6/mm3 4.42   Hemoglobin Latest Ref Range: 11.5 - 15.5 g/dL 14.2   Hematocrit Latest Ref Range: 34.5 - 44.0 % 43.2   RDW Latest Ref Range: 11.3 - 14.5 % 13.9   MCV Latest Ref Range: 80.0 - 99.0 fL 97.7   MCH Latest Ref Range: 27.0 - 31.0 pg 32.1 (H)   MCHC Latest Ref Range: 32.0 - 36.0 g/dL 32.9   MPV Latest Ref Range: 6.0 - 12.0 fL 10.5   Platelets Latest Ref Range: 150 - 450 10*3/mm3 213       Assessment and Plan:   Principal Problem:    S/P revision of total replacement of left knee  Active Problems:     Loose total knee arthroplasty    A-fib    HTN (hypertension)    Prediabetes      Plan  1. PT/OT- early ambulation post op  2. Pain control-prns   3. IS-encourage  4. DVT proph- mechs/Lovenox  5. Bowel regimen  6. Resume home medications as appropriate  7. Monitor post-op labs, BG  8. DC planning   SSI PRN, DM educator      Saadia Kennedy, HADLEY  02/17/17  3:56 PM   Seen and examined by me. Agree with above. Discussed with patient.

## 2017-02-17 NOTE — ANESTHESIA PROCEDURE NOTES
Peripheral Block    Patient location during procedure: OR  Start time: 2/17/2017 10:52 AM  Stop time: 2/17/2017 10:54 AM  Reason for block: at surgeon's request and post-op pain management  Performed by  Anesthesiologist: RUTHIE CARRANZA  Preanesthetic Checklist  Completed: patient identified, site marked, surgical consent, pre-op evaluation, timeout performed, IV checked, risks and benefits discussed and monitors and equipment checked  Peripheral Block Prep:  Sterile barriers:cap, gloves and mask  Prep: ChloraPrep  Patient monitoring: blood pressure monitoring, continuous pulse oximetry and EKG  Peripheral Procedure  Sedation:yes  Guidance:ultrasound guided  Images:still images obtained  Laterality:leftBlock Type:femoral  Injection Technique:single-shotNeedle Type:echogenic  Needle Gauge:20 G  ULTRASOUND INTERPRETATION. Using ultrasound guidance a gauge needle was placed in close proximity to the femoral nerve, at which point, under ultrasound guidance anesthetic was injected in the area of the nerve and spread of the anesthesia was seen on ultrasound in close proximity thereto.  There were no abnormalities seen on ultrasound; a digital image was taken; and the patient tolerated the procedure with no complications.   Medications  Local Injected:bupivacaine 0.25% without epinephrine Local Amount Injected:15mL  Post Assessment  Patient Tolerance:comfortable throughout block  Complications:no

## 2017-02-18 LAB
ANION GAP SERPL CALCULATED.3IONS-SCNC: 4 MMOL/L (ref 3–11)
BUN BLD-MCNC: 11 MG/DL (ref 9–23)
BUN/CREAT SERPL: 15.7 (ref 7–25)
CALCIUM SPEC-SCNC: 8.7 MG/DL (ref 8.7–10.4)
CHLORIDE SERPL-SCNC: 103 MMOL/L (ref 99–109)
CO2 SERPL-SCNC: 29 MMOL/L (ref 20–31)
CREAT BLD-MCNC: 0.7 MG/DL (ref 0.6–1.3)
DEPRECATED RDW RBC AUTO: 50 FL (ref 37–54)
ERYTHROCYTE [DISTWIDTH] IN BLOOD BY AUTOMATED COUNT: 14 % (ref 11.3–14.5)
GFR SERPL CREATININE-BSD FRML MDRD: 80 ML/MIN/1.73
GLUCOSE BLD-MCNC: 175 MG/DL (ref 70–100)
GLUCOSE BLDC GLUCOMTR-MCNC: 122 MG/DL (ref 70–130)
GLUCOSE BLDC GLUCOMTR-MCNC: 131 MG/DL (ref 70–130)
GLUCOSE BLDC GLUCOMTR-MCNC: 144 MG/DL (ref 70–130)
GLUCOSE BLDC GLUCOMTR-MCNC: 155 MG/DL (ref 70–130)
HCT VFR BLD AUTO: 32.9 % (ref 34.5–44)
HGB BLD-MCNC: 10.7 G/DL (ref 11.5–15.5)
MCH RBC QN AUTO: 31.8 PG (ref 27–31)
MCHC RBC AUTO-ENTMCNC: 32.5 G/DL (ref 32–36)
MCV RBC AUTO: 97.6 FL (ref 80–99)
PLATELET # BLD AUTO: 185 10*3/MM3 (ref 150–450)
PMV BLD AUTO: 10.6 FL (ref 6–12)
POTASSIUM BLD-SCNC: 4.2 MMOL/L (ref 3.5–5.5)
RBC # BLD AUTO: 3.37 10*6/MM3 (ref 3.89–5.14)
SODIUM BLD-SCNC: 136 MMOL/L (ref 132–146)
WBC NRBC COR # BLD: 10.53 10*3/MM3 (ref 3.5–10.8)

## 2017-02-18 PROCEDURE — 85027 COMPLETE CBC AUTOMATED: CPT | Performed by: NURSE PRACTITIONER

## 2017-02-18 PROCEDURE — 97166 OT EVAL MOD COMPLEX 45 MIN: CPT

## 2017-02-18 PROCEDURE — 97162 PT EVAL MOD COMPLEX 30 MIN: CPT | Performed by: PHYSICAL THERAPIST

## 2017-02-18 PROCEDURE — 25010000003 CEFAZOLIN IN DEXTROSE 2-4 GM/100ML-% SOLUTION: Performed by: ORTHOPAEDIC SURGERY

## 2017-02-18 PROCEDURE — 25010000002 ENOXAPARIN PER 10 MG: Performed by: ORTHOPAEDIC SURGERY

## 2017-02-18 PROCEDURE — 25010000002 ROPIVACAINE PER 1 MG: Performed by: NURSE ANESTHETIST, CERTIFIED REGISTERED

## 2017-02-18 PROCEDURE — 82962 GLUCOSE BLOOD TEST: CPT

## 2017-02-18 PROCEDURE — 97116 GAIT TRAINING THERAPY: CPT | Performed by: PHYSICAL THERAPIST

## 2017-02-18 PROCEDURE — 97110 THERAPEUTIC EXERCISES: CPT | Performed by: PHYSICAL THERAPIST

## 2017-02-18 PROCEDURE — 80048 BASIC METABOLIC PNL TOTAL CA: CPT | Performed by: NURSE PRACTITIONER

## 2017-02-18 RX ADMIN — HYDROCODONE BITARTRATE AND ACETAMINOPHEN 1 TABLET: 7.5; 325 TABLET ORAL at 12:53

## 2017-02-18 RX ADMIN — ENOXAPARIN SODIUM 40 MG: 40 INJECTION SUBCUTANEOUS at 08:46

## 2017-02-18 RX ADMIN — LEVOTHYROXINE SODIUM 112 MCG: 112 TABLET ORAL at 04:46

## 2017-02-18 RX ADMIN — SERTRALINE HYDROCHLORIDE 50 MG: 50 TABLET ORAL at 08:41

## 2017-02-18 RX ADMIN — CEFAZOLIN SODIUM 2 G: 2 INJECTION, SOLUTION INTRAVENOUS at 04:45

## 2017-02-18 RX ADMIN — HYDROCODONE BITARTRATE AND ACETAMINOPHEN 1 TABLET: 7.5; 325 TABLET ORAL at 18:20

## 2017-02-18 RX ADMIN — HYDROCODONE BITARTRATE AND ACETAMINOPHEN 1 TABLET: 7.5; 325 TABLET ORAL at 22:41

## 2017-02-18 RX ADMIN — DOCUSATE SODIUM AND SENNOSIDES 2 TABLET: 8.6; 5 TABLET, FILM COATED ORAL at 18:20

## 2017-02-18 RX ADMIN — HYDROCODONE BITARTRATE AND ACETAMINOPHEN 1 TABLET: 7.5; 325 TABLET ORAL at 10:26

## 2017-02-18 RX ADMIN — DOCUSATE SODIUM AND SENNOSIDES 2 TABLET: 8.6; 5 TABLET, FILM COATED ORAL at 08:41

## 2017-02-18 RX ADMIN — OXYBUTYNIN CHLORIDE 5 MG: 5 TABLET, EXTENDED RELEASE ORAL at 08:41

## 2017-02-18 RX ADMIN — POLYETHYLENE GLYCOL 3350 17 G: 17 POWDER, FOR SOLUTION ORAL at 08:43

## 2017-02-18 RX ADMIN — PANTOPRAZOLE SODIUM 40 MG: 40 TABLET, DELAYED RELEASE ORAL at 04:45

## 2017-02-18 RX ADMIN — ATORVASTATIN CALCIUM 40 MG: 40 TABLET, FILM COATED ORAL at 20:09

## 2017-02-18 RX ADMIN — ROPIVACAINE HYDROCHLORIDE 10 ML/HR: 2 INJECTION, SOLUTION EPIDURAL; INFILTRATION at 12:53

## 2017-02-18 RX ADMIN — AMIODARONE HYDROCHLORIDE 100 MG: 200 TABLET ORAL at 08:41

## 2017-02-18 NOTE — PLAN OF CARE
Problem: Patient Care Overview (Adult)  Goal: Plan of Care Review  Outcome: Ongoing (interventions implemented as appropriate)    02/18/17 1532   Coping/Psychosocial Response Interventions   Plan Of Care Reviewed With patient   Patient Care Overview   Progress improving   Outcome Evaluation   Outcome Summary/Follow up Plan Pt demonstrating increased IND with bed mobility and transfers; increased gait distance noted;          Problem: Inpatient Physical Therapy  Goal: Bed Mobility Goal LTG- PT  Outcome: Ongoing (interventions implemented as appropriate)    02/18/17 0917 02/18/17 1532   Bed Mobility PT LTG   Bed Mobility PT LTG, Date Established 02/18/17 --    Bed Mobility PT LTG, Time to Achieve 5 days --    Bed Mobility PT LTG, Activity Type supine to sit/sit to supine --    Bed Mobility PT LTG, [place] Level conditional independence --    Bed Mobility PT LTG, Date Goal Reviewed --  02/18/17   Bed Mobility PT LTG, Outcome --  goal ongoing       Goal: Transfer Training Goal 1 LTG- PT  Outcome: Ongoing (interventions implemented as appropriate)    02/18/17 0917 02/18/17 1532   Transfer Training PT LTG   Transfer Training PT LTG, Date Established 02/18/17 --    Transfer Training PT LTG, Time to Achieve 5 days --    Transfer Training PT LTG, Activity Type bed to chair /chair to bed;sit to stand/stand to sit --    Transfer Training PT LTG, [place] Level supervision required --    Transfer Training PT LTG, Assist Device walker, rolling --    Transfer Training PT LTG, Date Goal Reviewed --  02/18/17   Transfer Training PT LTG, Outcome --  goal ongoing       Goal: Gait Training Goal LTG- PT  Outcome: Ongoing (interventions implemented as appropriate)    02/18/17 0917 02/18/17 1532   Gait Training PT LTG   Gait Training Goal PT LTG, Date Established 02/18/17 --    Gait Training Goal PT LTG, Time to Achieve 5 days --    Gait Training Goal PT LTG, [place] Level supervision required --    Gait Training Goal PT  LTG, Assist Device walker, rolling --    Gait Training Goal PT LTG, Distance to Achieve 250 --    Gait Training Goal PT LTG, Date Goal Reviewed --  02/18/17   Gait Training Goal PT LTG, Outcome --  goal ongoing       Goal: Range of Motion Goal LTG- PT  Outcome: Ongoing (interventions implemented as appropriate)    02/18/17 0917 02/18/17 1532   Range of Motion PT LTG   Range of Motion Goal PT LTG, Date Established 02/18/17 --    Range of Motion Goal PT LTG, Time to Achieve 5 days --    Range fo Motion Goal PT LTG, Joint L knee --    Range of Motion Goal PT LTG, AROM Measure 0-110 --    Range of Motion Goal PT LTG, Date Goal Reviewed --  02/18/17   Range of Motion Goal PT LTG, Outcome --  goal ongoing       Goal: Patient Education Goal LTG- PT  Outcome: Ongoing (interventions implemented as appropriate)    02/18/17 0917 02/18/17 1532   Patient Education PT LTG   Patient Education PT LTG, Date Established 02/18/17 --    Patient Education PT LTG, Time to Achieve 5 days --    Patient Education PT LTG, Education Type HEP;precaution per surgeon;gait;transfers;bed mobility;benefits of activity;pain management --    Patient Education PT LTG, Education Understanding demonstrate adequately;verbalize understanding --    Patient Education PT LTG, Date Goal Reviewed --  02/18/17   Patient Education PT LTG Outcome --  goal ongoing

## 2017-02-18 NOTE — PROGRESS NOTES
"IM progress note      Radha Ruggiero  [id number]  7/3/1932     LOS: 1 day     Attending: Simon Colvin MD    Primary Care Provider: Anuel Amador MD      Chief Complaint/Reason for visit:  No chief complaint on file.      Subjective    Good pain control. Started PT this am. No f/c/n/vom.     Objective     Vital Signs  Blood pressure 104/59, pulse 79, temperature 98.9 °F (37.2 °C), temperature source Oral, resp. rate 16, height 62\" (157.5 cm), weight 162 lb (73.5 kg), SpO2 97 %.  Temp (24hrs), Av °F (36.7 °C), Min:97.3 °F (36.3 °C), Max:98.9 °F (37.2 °C)      Intake/Output:    Intake/Output Summary (Last 24 hours) at 17 1510  Last data filed at 17 1300   Gross per 24 hour   Intake   1440 ml   Output   1700 ml   Net   -260 ml             Physical Exam:     General Appearance:    Alert, cooperative, in no acute distress   Head:    Normocephalic, without obvious abnormality, atraumatic    Lungs:     Normal effort, symmetric chest rise, no crepitus, clear to      auscultation bilaterally              Heart:    Regular rhythm and normal rate, normal S1 and S2    Abdomen:     Normal bowel sounds, no masses, no organomegaly, soft        non-tender, non-distended, no guarding, no rebound                tenderness   Extremities:   No clubbing, cyanosis or edema.  No deformities.     CDI prineo Left knee.    Pulses:   Pulses palpable and equal bilaterally   Skin:   No bleeding, bruising or rash   Neurologic:   Moves all extremities with no obvious focal motor deficit.Flexion and dorsiflexion intact bilateral feet.       Results Review:     I reviewed the patient's new clinical results.     Results from last 7 days  Lab Units 17  0521   WBC 10*3/mm3 10.53   HEMOGLOBIN g/dL 10.7*   HEMATOCRIT % 32.9*   PLATELETS 10*3/mm3 185       Results from last 7 days  Lab Units 17  0521   SODIUM mmol/L 136   POTASSIUM mmol/L 4.2   CHLORIDE mmol/L 103   TOTAL CO2 mmol/L 29.0   BUN mg/dL 11 "   CREATININE mg/dL 0.70   CALCIUM mg/dL 8.7   GLUCOSE mg/dL 175*     I reviewed the patient's new imaging including images and reports.    All medications reviewed.     amiodarone 100 mg Oral Daily   atorvastatin 40 mg Oral Daily   enoxaparin 40 mg Subcutaneous Daily   insulin lispro 2-7 Units Subcutaneous 4x Daily With Meals & Nightly   levothyroxine 112 mcg Oral Q AM   oxybutynin XL 5 mg Oral Daily   pantoprazole 40 mg Oral Q AM   polyethylene glycol 17 g Oral Daily   sennosides-docusate sodium 2 tablet Oral BID   sertraline 50 mg Oral Daily       dextrose 25 g Q15 Min PRN   dextrose 15 g Q15 Min PRN   glucagon (human recombinant) 1 mg Q15 Min PRN   HYDROcodone-acetaminophen 1 tablet Q4H PRN   HYDROmorphone 0.5 mg Q2H PRN   And     naloxone 0.1 mg Q5 Min PRN   promethazine 12.5 mg Q6H PRN   Or     promethazine 12.5 mg Q6H PRN   sodium chloride 500 mL TID PRN       Assessment/Plan     Principal Problem:    S/P revision of total replacement of left knee  Active Problems:    Loose total knee arthroplasty    A-fib    HTN (hypertension)    Prediabetes    Acute blood loss anemia, mild, asymptomatic        Plan  1. PT/OT. Continue.Weight bearing as tolerated LLE.  2. Pain control-prns, and ACB.  3. IS-encouraged  4. DVT proph-Lovenox till f/u with  then switch to Eliquis if ok with him.   5. Bowel regimen  6. Monitor post-op labs  7. DC planning. To rehab facility early next week if ready.   Discussed with patient and .    Shamir Lamb MD  02/18/17  3:10 PM

## 2017-02-18 NOTE — PLAN OF CARE
Problem: Patient Care Overview (Adult)  Goal: Plan of Care Review  Outcome: Ongoing (interventions implemented as appropriate)    02/18/17 0207   Coping/Psychosocial Response Interventions   Plan Of Care Reviewed With patient;daughter   Patient Care Overview   Progress progress toward functional goals as expected

## 2017-02-18 NOTE — PLAN OF CARE
Problem: Patient Care Overview (Adult)  Goal: Plan of Care Review  Outcome: Ongoing (interventions implemented as appropriate)    02/18/17 0913   Coping/Psychosocial Response Interventions   Plan Of Care Reviewed With patient   Outcome Evaluation   Outcome Summary/Follow up Plan OT javier complete. Pt presents with impulsivity / poor attn to task and decreased strength and balacne. Pt will benefit from OT to advance pt toward PLOF with ADLs and functioanl mobility.         Problem: Inpatient Occupational Therapy  Goal: Bed Mobility Goal LTG- OT  Outcome: Ongoing (interventions implemented as appropriate)    02/18/17 0913   Bed Mobility OT LTG   Bed Mobility OT LTG, Date Established 02/18/17   Bed Mobility OT LTG, Time to Achieve 1 wk   Bed Mobility OT LTG, Activity Type all bed mobility   Bed Mobility OT LTG, [place] Level supervision required       Goal: Safety Awareness Goal STG- OT  Outcome: Ongoing (interventions implemented as appropriate)    02/18/17 0913   Safety Awareness OT STG   Safety Awareness OT STG, Date Established 02/18/17   Safety Awareness OT STG, Time to Achieve by discharge   Safety Awareness OT STG, Activity Type good safety awareness;with ADL's       Goal: LB Dressing Goal LTG- OT  Outcome: Ongoing (interventions implemented as appropriate)    02/18/17 0913   LB Dressing OT LTG   LB Dressing Goal OT LTG, Date Established 02/18/17   LB Dressing Goal OT LTG, Time to Achieve by discharge   LB Dressing Goal OT LTG, [place] Level moderate assist (50% patient effort)   LB Dressing Goal OT LTG, Adaptive Equipment (AE as appropriate)       Goal: Functional Mobility Goal LTG- OT  Outcome: Ongoing (interventions implemented as appropriate)    02/18/17 0913   Functional Mobility OT LTG   Functional Mobility Goal OT LTG, Date Established 02/18/17   Functional Mobility Goal OT LTG, Time to Achieve by discharge   Functional Mobility Goal OT LTG, [place] Level contact guard   Functional Mobility  Goal OT LTG, Assist Device rolling walker   Functional Mobility Goal OT LTG, Distance to Achieve to the bathroom;in hallway

## 2017-02-18 NOTE — PROGRESS NOTES
Acute Care - Physical Therapy Initial Evaluation  [facility]     Patient Name: Radha Ruggiero  : 7/3/1932  MRN: [id number]  Today's Date: 2017   Onset of Illness/Injury or Date of Surgery Date: 17  Date of Referral to PT: 17  Referring Physician: Vinicius      Admit Date: 2017     Visit Dx:    ICD-10-CM ICD-9-CM   1. Impaired functional mobility, balance, gait, and endurance Z74.09 V49.89   2. Loose total knee arthroplasty T84.038A 996.41    Z96.659      Patient Active Problem List   Diagnosis   • Loose total knee arthroplasty   • S/P revision of total replacement of left knee   • A-fib   • HTN (hypertension)   • Prediabetes     Past Medical History   Diagnosis Date   • Arthritis    • Atrial fibrillation    • Back pain    • Blepharitis, both eyes    • Cancer      left breast   • Glaucoma      right eye   • Hypertension    • PONV (postoperative nausea and vomiting)      TARA WITH MORPHINE   • Wears glasses      Past Surgical History   Procedure Laterality Date   • Breast lumpectomy       left   • Eye surgery     • Total knee arthroplasty Bilateral    • Shoulder arthroscopy       X2   • [place] arthroplasty     • Foot surgery Bilateral      BUNIONECTOMY, HAMMER TOE          PT ASSESSMENT (last 72 hours)      PT Evaluation       17 0812 17 0810    Rehab Evaluation    Document Type evaluation  - evaluation  -AC    Subjective Information agree to therapy;complains of;pain  - agree to therapy  -AC    Patient Effort, Rehab Treatment good  -JM good  -AC    Symptoms Noted During/After Treatment  --   impulsive  -AC    General Information    Patient Profile Review yes  -JM yes  -AC    Onset of Illness/Injury or Date of Surgery Date 17  - 17  -AC    Referring Physician Vinicius  -ELSA Colvin MD  -AC    General Observations Pt lying supine in bed; daughter present  - Pt received supine in bed sleeping. Dtr present in room.  -AC    Pertinent History Of Current Problem  Pt with loose components of left TKA from 10 years ago; revised left TKA  - Pt S/P L knee revision  -AC    Precautions/Limitations fall precautions   Left nerve block catheter, impulsive, KI on LLE  - fall precautions   KI when up, nerve cath, impilsive   -AC    Prior Level of Function independent:;all household mobility;community mobility;ADL's;home management   Reports pain in left knee was significant and limited QoL  - independent:;all household mobility;ADL's   increased pain with all activity  -    Equipment Currently Used at Home walker, rolling;commode  - walker, rolling;commode  -AC    Plans/Goals Discussed With patient  -JM patient;agreed upon  -AC    Risks Reviewed patient:;LOB;nausea/vomiting;dizziness;increased discomfort;change in vital signs;increased drainage  - patient:;LOB  -AC    Benefits Reviewed patient:;improve function;increase strength;increase independence;increase balance;decrease pain;decrease risk of DVT  - patient:;improve function;increase independence;increase strength;increase balance;increase knowledge  -    Barriers to Rehab cognitive status  - cognitive status   pt very impulsive  -AC    Living Environment    Lives With alone  - alone  -    Living Arrangements house  - house  -    Home Accessibility ramps present at home;bed and bath on same level   Lives on first floor of 2 level home  - bed and bath on same level;ramps present at home  -    Clinical Impression    Date of Referral to PT 02/17/17  -     PT Diagnosis Impaired mobility and strength  -     Patient/Family Goals Statement Return home  -     Criteria for Skilled Therapeutic Interventions Met yes;treatment indicated  -     Rehab Potential good, to achieve stated therapy goals  -     Vital Signs    Pre SpO2 (%) 96  -JM     O2 Delivery Pre Treatment room air  -     Post SpO2 (%) 97  -JM     O2 Delivery Post Treatment room air  -     Post Patient Position Sitting  -     Pain  Assessment    Pain Assessment 0-10  - 0-10  -AC    Pain Score 4  -JM 4  -AC    Post Pain Score 4  -JM 4  -AC    Pain Type Surgical pain  -JM Surgical pain  -AC    Pain Location Knee  - Knee  -AC    Pain Orientation Left  -JM Left  -AC    Pain Intervention(s)  Repositioned  -AC    Response to Interventions  tolerated  -AC    Vision Assessment/Intervention    Visual Impairment  WNL  -AC    Cognitive Assessment/Intervention    Current Cognitive/Communication Assessment impaired  - impaired   [place]  -AC    Orientation Status oriented x 4  -JM oriented x 4  -AC    Follows Commands/Answers Questions 25% of the time;needs cueing  - 25% of the time;needs cueing;needs repetition   very poor attn to task  -    Personal Safety moderate impairment;impulsive;decreased insight to deficits;decreased awareness, need for assist;decreased awareness, need for safety  - impulsive;moderate impairment  -    Personal Safety Interventions fall prevention program maintained;gait belt;muscle strengthening facilitated;nonskid shoes/slippers when out of bed  - elopement precautions initiated;fall prevention program maintained;gait belt;nonskid shoes/slippers when out of bed  -    ROM (Range of Motion)    General ROM lower extremity range of motion deficits identified  - no range of motion deficits identified  -    General LE Assessment    ROM knee, left: LE ROM deficit  -     ROM Detail --   Left knee AROM 0-95   -     MMT (Manual Muscle Testing)    General MMT Assessment lower extremity strength deficits identified  - no strength deficits identified  -    General MMT Assessment Detail --   RLE grossly 5/5; LLE grossly 3+/5  -     Bed Mobility, Assessment/Treatment    Bed Mobility, Assistive Device bed rails  -     Bed Mob, Supine to Sit, [place] contact guard assist;verbal cues required  - verbal cues required;contact guard assist   multiple cues for safety  -    Bed Mobility, Comment  pt attempting to  get to EOB before safe   -AC    Transfer Assessment/Treatment    Transfers, Bed-Chair [place] minimum assist (75% patient effort);verbal cues required;set up required;2 person assist required  -JM     Transfers, Sit-Stand [place] minimum assist (75% patient effort);2 person assist required;verbal cues required  -JM verbal cues required;minimum assist (75% patient effort);2 person assist required  -AC    Transfers, Stand-Sit [place]  verbal cues required;minimum assist (75% patient effort);2 person assist required  -AC    Transfers, Sit-Stand-Sit, Assist Device rolling walker  -JM rolling walker  -AC    Transfer, Impairments ROM decreased;strength decreased;impaired balance;pain  - strength decreased;impaired balance  -AC    Transfer, Comment --   VCs for safety awareness and hand placement  - --   multiple cues for hand placement, pt impulsive  -AC    Gait Assessment/Treatment    Gait, [place] Level minimum assist (75% patient effort);verbal cues required  -     Gait, Assistive Device rolling walker  -     Gait, Distance (Feet) 25  -     Gait, Gait Pattern Analysis swing-to gait  -     Gait, Gait Deviations antalgic;lola decreased;decreased heel strike;forward flexed posture   Left knee moving into minor hyperextension in stance phase  -     Therapy Exercises    Exercise Protocols total knee  -     Total Knee Exercises left:;10 reps;completed protocol   Fair quad contraction present; minimal lag noted with SLR  -JM     Positioning and Restraints    Pre-Treatment Position in bed  -JM in bed  -AC    Post Treatment Position chair  - chair  -AC    In Chair notified nsg;reclined;call light within reach;encouraged to call for assist;exit alarm on;with nsg;LLE elevated  -JM reclined;call light within reach;encouraged to call for assist;exit alarm on;with nsg  -AC      02/18/17 0607 02/18/17 0418    Muscle Tone Assessment    Muscle Tone Assessment LLE  -KG LLE  -KG    LLE Muscle Tone  Assessment mildly decreased tone  -KG mildly decreased tone  -KG      02/18/17 0147 02/18/17 0010    Muscle Tone Assessment    Muscle Tone Assessment LLE  -KG LLE  -KG    LLE Muscle Tone Assessment mildly decreased tone  -KG mildly decreased tone  -KG      02/17/17 2225 02/17/17 2052    Muscle Tone Assessment    Muscle Tone Assessment LLE  -KG LLE  -KG    LLE Muscle Tone Assessment mildly decreased tone  -KG mildly decreased tone  -KG      02/17/17 1011       General Information    Equipment Currently Used at Home bath bench;walker, rolling;raised toilet  -TS     Living Environment    Lives With alone  -TS     Living Arrangements house  -TS     Home Accessibility bed and bath on same level;stairs within home   Pt states she has a bed and bath on each floor, states she also has a chair lift.  -TS     Stair Railings at Home inside, present on left side  -TS     Type of Financial/Environmental Concern none  -TS     Transportation Available car;family or friend will provide  -TS       User Key  (r) = Recorded By, (t) = Taken By, (c) = Cosigned By    Initials Name Provider Type    AC Gely Adams, OT Occupational Therapist    ELSA Jorge, PT Physical Therapist     Katharina Lainez, RN Registered Nurse    CHRISTELLE Carcamo, RN Registered Nurse          Physical Therapy Education     Title: PT OT SLP Therapies (Active)     Topic: Physical Therapy (Done)     Point: Mobility training (Done)    Learning Progress Summary    Learner Readiness Method Response Comment Documented by Status   Patient Acceptance MARY CARMEN MANZANARES 02/18/17 0915 Done               Point: Home exercise program (Done)    Learning Progress Summary    Learner Readiness Method Response Comment Documented by Status   Patient Acceptance MARY CARMEN MANZANARES 02/18/17 0915 Done               Point: Body mechanics (Done)    Learning Progress Summary    Learner Readiness Method Response Comment Documented by Status   Patient Acceptance MARY CARMEN MANZANARES 02/18/17 0915 Done                Point: Precautions (Done)    Learning Progress Summary    Learner Readiness Method Response Comment Documented by Status   Patient Acceptance E MALIKA,NR   02/18/17 0915 Done                      User Key     Initials Effective Dates Name Provider Type Discipline     06/19/15 -  Pedro Jorge, PT Physical Therapist PT                PT Recommendation and Plan  Anticipated Discharge Disposition: home with home health, home with assist  Planned Therapy Interventions: balance training, bed mobility training, gait training, home exercise program, patient/family education, strengthening, transfer training  PT Frequency: 2 times/day, per priority policy  Plan of Care Review  Plan Of Care Reviewed With: patient  Progress: improving  Outcome Summary/Follow up Plan: Pt is 85 y/o WF with evolving signs and symptoms r/t left TKA and will benefit from skilled PT to address deficits in strength, balance, functional mobility, and ROM in order to assist pt in returning home [facility].            IP PT Goals       02/18/17 0917          Bed Mobility PT LTG    Bed Mobility PT LTG, Date Established 02/18/17  -      Bed Mobility PT LTG, Time to Achieve 5 days  -      Bed Mobility PT LTG, Activity Type supine to sit/sit to supine  -      Bed Mobility PT LTG, [place] Level conditional independence  -      Bed Mobility PT LTG, Date Goal Reviewed 02/18/17  -      Bed Mobility PT LTG, Outcome goal ongoing  -      Transfer Training PT LTG    Transfer Training PT LTG, Date Established 02/18/17  -      Transfer Training PT LTG, Time to Achieve 5 days  -      Transfer Training PT LTG, Activity Type bed to chair /chair to bed;sit to stand/stand to sit  -      Transfer Training PT LTG, [place] Level supervision required  -      Transfer Training PT LTG, Assist Device walker, rolling  -      Transfer Training PT  LTG, Date Goal Reviewed 02/18/17  -      Transfer Training PT LTG, Outcome goal ongoing   -JM      Gait Training PT LTG    Gait Training Goal PT LTG, Date Established 02/18/17  -      Gait Training Goal PT LTG, Time to Achieve 5 days  -JM      Gait Training Goal PT LTG, [place] Level supervision required  -      Gait Training Goal PT LTG, Assist Device walker, rolling  -      Gait Training Goal PT LTG, Distance to Achieve 250  -JM      Gait Training Goal PT LTG, Date Goal Reviewed 02/18/17  -      Gait Training Goal PT LTG, Outcome goal ongoing  -      Range of Motion PT LTG    Range of Motion Goal PT LTG, Date Established 02/18/17  -      Range of Motion Goal PT LTG, Time to Achieve 5 days  -JM      Range fo Motion Goal PT LTG, Joint L knee  -JM      Range of Motion Goal PT LTG, AROM Measure 0-110  -JM      Range of Motion Goal PT LTG, Date Goal Reviewed 02/18/17  -      Range of Motion Goal PT LTG, Outcome goal ongoing  -      Patient Education PT LTG    Patient Education PT LTG, Date Established 02/18/17  -      Patient Education PT LTG, Time to Achieve 5 days  -JM      Patient Education PT LTG, Education Type HEP;precaution per surgeon;gait;transfers;bed mobility;benefits of activity;pain management  -      Patient Education PT LTG, Education Understanding demonstrate adequately;verbalize understanding  -      Patient Education PT LTG, Date Goal Reviewed 02/18/17  -      Patient Education PT LTG Outcome goal ongoing  -        User Key  (r) = Recorded By, (t) = Taken By, (c) = Cosigned By    Initials Name Provider Type    ELSA Jorge, PT Physical Therapist                Outcome Measures       02/18/17 0812 02/18/17 0810       How much help from another person do you currently need...    Turning from your back to your side while in flat bed without using bedrails? 3  -JM      Moving from lying on back to sitting on the side of a flat bed without bedrails? 3  -JM      Moving to and from a bed to a chair (including a wheelchair)? 3  -JM      Standing up from a chair  using your arms (e.g., wheelchair, bedside chair)? 3  -JM      Climbing 3-5 steps with a railing? 2  -JM      To walk in hospital room? 3  -JM      AM-PAC 6 Clicks Score 17  -      How much help from another is currently needed...    Putting on and taking off regular lower body clothing?  2  -AC     Bathing (including washing, rinsing, and drying)  2  -AC     Toileting (which includes using toilet bed pan or urinal)  3  -AC     Putting on and taking off regular upper body clothing  3  -AC     Taking care of personal grooming (such as brushing teeth)  3  -AC     Eating meals  4  -AC     Score  17  -     Functional Assessment    Outcome Measure Options AM-PAC 6 Clicks Basic Mobility (PT)  - AM-PAC 6 Clicks Daily Activity (OT)  -       User Key  (r) = Recorded By, (t) = Taken By, (c) = Cosigned By    Initials Name Provider Type     Gely Adams, OT Occupational Therapist    ELSA Jorge, PT Physical Therapist           Time Calculation:         PT Charges       02/18/17 0916          Time Calculation    Start Time 0812  -      PT Received On 02/18/17  -      PT Goal Re-Cert Due Date 02/28/17  -      Time Calculation- PT    Total Timed Code Minutes- PT 15 minute(s)  -        User Key  (r) = Recorded By, (t) = Taken By, (c) = Cosigned By    Initials Name Provider Type    ELSA Jorge, PT Physical Therapist          Therapy Charges for Today     Code Description Service Date Service Provider Modifiers Qty    93801093315 HC PT EVAL MOD COMPLEXITY 3 2/18/2017 Pedro Jorge, PT GP 1    30517793210 HC PT THER PROC EA 15 MIN 2/18/2017 Pedro Jorge, PT GP 1          PT G-Codes  Outcome Measure Options: AM-PAC 6 Clicks Basic Mobility (PT)      Pedro Jorge, PT  2/18/2017

## 2017-02-18 NOTE — PROGRESS NOTES
Acute Care - Physical Therapy Treatment Note  [facility]     Patient Name: Radha Ruggiero  : 7/3/1932  MRN: [id number]  Today's Date: 2017  Onset of Illness/Injury or Date of Surgery Date: 17  Date of Referral to PT: 17  Referring Physician: Vinicius    Admit Date: 2017    Visit Dx:    ICD-10-CM ICD-9-CM   1. Impaired functional mobility, balance, gait, and endurance Z74.09 V49.89   2. Loose total knee arthroplasty T84.038A 996.41    Z96.659      Patient Active Problem List   Diagnosis   • Loose total knee arthroplasty   • S/P revision of total replacement of left knee   • A-fib   • HTN (hypertension)   • Prediabetes               Adult Rehabilitation Note       17 1503          Rehab Assessment/Intervention    Discipline physical therapist  -      Document Type therapy note (daily note)  -      Subjective Information agree to therapy;no complaints  -      Patient Effort, Rehab Treatment good  -      Precautions/Limitations fall precautions   Nerve block catheter; KI when up; impulsive  -      Recorded by [ELSA] Pedro Jorge, PT      Pain Assessment    Pain Assessment 0-10  -      Pain Score 2  -      Post Pain Score 2  -      Pain Type Surgical pain  -      Pain Location Knee  -      Pain Orientation Left  -      Recorded by [ELSA] Pedro Jorge, PT      Cognitive Assessment/Intervention    Current Cognitive/Communication Assessment impaired  -      Orientation Status oriented x 4  -      Follows Commands/Answers Questions 50% of the time  -      Personal Safety moderate impairment;decreased awareness, need for assist;decreased awareness, need for safety;decreased insight to deficits  -      Personal Safety Interventions fall prevention program maintained;gait belt;muscle strengthening facilitated;nonskid shoes/slippers when out of bed  -      Recorded by [ELSA] Pedro Jorge, PT      Bed Mobility, Assessment/Treatment    Bed Mobility, Assistive Device  head of bed elevated  -      Bed Mob, Supine to Sit, [place] contact guard assist  -      Recorded by [ELSA] Pedro Jorge, PT      Transfer Assessment/Treatment    Transfers, Sit-Stand [place] minimum assist (75% patient effort);verbal cues required  -      Transfers, Sit-Stand-Sit, Assist Device rolling walker  -      Transfer, Comment --   Impulsive, VCs for hand placement, and safe technique  -      Recorded by [ELSA] Pedro Jorge, PT      Gait Assessment/Treatment    Gait, [place] Level contact guard assist;verbal cues required  -      Gait, Assistive Device rolling walker  -      Gait, Distance (Feet) 45  -      Gait, Gait Pattern Analysis swing-to gait  -      Gait, Gait Deviations forward flexed posture;antalgic;limb motion velocity decreased   Shuffling to advance feet  -      Recorded by [ELSA] Pedro Jorge, PT      Therapy Exercises    Exercise Protocols total knee  -      Total Knee Exercises left:;10 reps;completed protocol;with assist;ankle pumps/circles;quad set;glut set;heel slides;SLR;SAQ;hip abduction/adduction  -      Recorded by [ELSA] Pedro Jorge, PT      Positioning and Restraints    Pre-Treatment Position in bed  -      Post Treatment Position chair  -JM      In Chair notified nsg;reclined;call light within reach;encouraged to call for assist;exit alarm on;LLE elevated  -      Recorded by [ELSA] Pedro Jorge, PT        User Key  (r) = Recorded By, (t) = Taken By, (c) = Cosigned By    Initials Name Effective Dates     Pedro Jorge, PT 06/19/15 -                 IP PT Goals       02/18/17 1532 02/18/17 0917       Bed Mobility PT LTG    Bed Mobility PT LTG, Date Established  02/18/17  -     Bed Mobility PT LTG, Time to Achieve  5 days  -     Bed Mobility PT LTG, Activity Type  supine to sit/sit to supine  -     Bed Mobility PT LTG, [place] Level  conditional independence  -     Bed Mobility PT LTG, Date Goal Reviewed 02/18/17  -JM 02/18/17   -     Bed Mobility PT LTG, Outcome goal ongoing  - goal ongoing  -     Transfer Training PT LTG    Transfer Training PT LTG, Date Established  02/18/17  -     Transfer Training PT LTG, Time to Achieve  5 days  -JM     Transfer Training PT LTG, Activity Type  bed to chair /chair to bed;sit to stand/stand to sit  -     Transfer Training PT LTG, [place] Level  supervision required  -     Transfer Training PT LTG, Assist Device  walker, rolling  -     Transfer Training PT  LTG, Date Goal Reviewed 02/18/17  -JM 02/18/17  -     Transfer Training PT LTG, Outcome goal ongoing  - goal ongoing  -     Gait Training PT LTG    Gait Training Goal PT LTG, Date Established  02/18/17  -     Gait Training Goal PT LTG, Time to Achieve  5 days  -JM     Gait Training Goal PT LTG, [place] Level  supervision required  -     Gait Training Goal PT LTG, Assist Device  walker, rolling  -     Gait Training Goal PT LTG, Distance to Achieve  250  -JM     Gait Training Goal PT LTG, Date Goal Reviewed 02/18/17  -JM 02/18/17  -     Gait Training Goal PT LTG, Outcome goal ongoing  - goal ongoing  -     Range of Motion PT LTG    Range of Motion Goal PT LTG, Date Established  02/18/17  -     Range of Motion Goal PT LTG, Time to Achieve  5 days  -JM     Range fo Motion Goal PT LTG, Joint  L knee  -JM     Range of Motion Goal PT LTG, AROM Measure  0-110  -JM     Range of Motion Goal PT LTG, Date Goal Reviewed 02/18/17  -JM 02/18/17  -     Range of Motion Goal PT LTG, Outcome goal ongoing  - goal ongoing  -     Patient Education PT LTG    Patient Education PT LTG, Date Established  02/18/17  -     Patient Education PT LTG, Time to Achieve  5 days  -JM     Patient Education PT LTG, Education Type  HEP;precaution per surgeon;gait;transfers;bed mobility;benefits of activity;pain management  -     Patient Education PT LTG, Education Understanding  demonstrate adequately;verbalize understanding  -      Patient Education PT LTG, Date Goal Reviewed 02/18/17  -ELSA 02/18/17  -ELSA     Patient Education PT LTG Outcome goal ongoing  - goal ongoing  -       User Key  (r) = Recorded By, (t) = Taken By, (c) = Cosigned By    Initials Name Provider Type    ELSA Jorge PT Physical Therapist          Physical Therapy Education     Title: PT OT SLP Therapies (Active)     Topic: Physical Therapy (Done)     Point: Mobility training (Done)    Learning Progress Summary    Learner Readiness Method Response Comment Documented by Status   Patient Acceptance E VU,NR   02/18/17 1532 Done    Acceptance E VU,NR   02/18/17 0915 Done               Point: Home exercise program (Done)    Learning Progress Summary    Learner Readiness Method Response Comment Documented by Status   Patient Acceptance E VU,NR   02/18/17 1532 Done    Acceptance E VU,NR   02/18/17 0915 Done               Point: Body mechanics (Done)    Learning Progress Summary    Learner Readiness Method Response Comment Documented by Status   Patient Acceptance E VU,NR   02/18/17 1532 Done    Acceptance E VU,NR   02/18/17 0915 Done               Point: Precautions (Done)    Learning Progress Summary    Learner Readiness Method Response Comment Documented by Status   Patient Acceptance E VU,NR   02/18/17 1532 Done    Acceptance E VU,NR   02/18/17 0915 Done                      User Key     Initials Effective Dates Name Provider Type [facility] 06/19/15 -  Pedro Jorge, PT Physical Therapist PT                    PT Recommendation and Plan  Anticipated Discharge Disposition: home with home health, home with assist  Planned Therapy Interventions: balance training, bed mobility training, gait training, home exercise program, patient/family education, strengthening, transfer training  PT Frequency: 2 times/day, per priority policy  Plan of Care Review  Plan Of Care Reviewed With: patient  Progress: improving  Outcome Summary/Follow up Plan: Pt  demonstrating increased IND with bed mobility and transfers; increased gait distance noted;           Outcome Measures       02/18/17 1503 02/18/17 0812 02/18/17 0810    How much help from another person do you currently need...    Turning from your back to your side while in flat bed without using bedrails? 3  -JM 3  -JM     Moving from lying on back to sitting on the side of a flat bed without bedrails? 3  -JM 3  -JM     Moving to and from a bed to a chair (including a wheelchair)? 3  -JM 3  -JM     Standing up from a chair using your arms (e.g., wheelchair, bedside chair)? 3  -JM 3  -JM     Climbing 3-5 steps with a railing? 2  -JM 2  -JM     To walk in hospital room? 3  -JM 3  -JM     AM-PAC 6 Clicks Score 17  -JM 17  -JM     How much help from another is currently needed...    Putting on and taking off regular lower body clothing?   2  -AC    Bathing (including washing, rinsing, and drying)   2  -AC    Toileting (which includes using toilet bed pan or urinal)   3  -AC    Putting on and taking off regular upper body clothing   3  -AC    Taking care of personal grooming (such as brushing teeth)   3  -AC    Eating meals   4  -AC    Score   17  -AC    Functional Assessment    Outcome Measure Options AM-PAC 6 Clicks Basic Mobility (PT)  - AM-PAC 6 Clicks Basic Mobility (PT)  - AM-PAC 6 Clicks Daily Activity (OT)  -AC      User Key  (r) = Recorded By, (t) = Taken By, (c) = Cosigned By    Initials Name Provider Type    AC Gely Adams, OT Occupational Therapist    ELSA Jorge, PT Physical Therapist           Time Calculation:         PT Charges       02/18/17 1535 02/18/17 0916       Time Calculation    Start Time 1503  - 0812  -     PT Received On 02/18/17  -JM 02/18/17  -     PT Goal Re-Cert Due Date 02/28/17  - 02/28/17  -     Time Calculation- PT    Total Timed Code Minutes- PT 24 minute(s)  - 15 minute(s)  -       User Key  (r) = Recorded By, (t) = Taken By, (c) = Cosigned By    Initials  Name Provider Type    JM Pedro Jorge, PT Physical Therapist          Therapy Charges for Today     Code Description Service Date Service Provider Modifiers Qty    06508053089 HC PT EVAL MOD COMPLEXITY 3 2/18/2017 Pedro Jorge, PT GP 1    02264811924 HC PT THER PROC EA 15 MIN 2/18/2017 Pedro Jorge, PT GP 1    33504364989 HC PT THER PROC EA 15 MIN 2/18/2017 Pedro Jorge, PT GP 1    95624184619 HC GAIT TRAINING EA 15 MIN 2/18/2017 Pedro Jorge, PT GP 1          PT G-Codes  Outcome Measure Options: AM-PAC 6 Clicks Basic Mobility (PT)    Pedro Jorge, PT  2/18/2017

## 2017-02-18 NOTE — PROGRESS NOTES
Discharge Planning Assessment  [facility]     Patient Name: Radha Ruggiero  MRN: [id number]  Today's Date: 2/18/2017    Admit Date: 2/17/2017          Discharge Needs Assessment       02/18/17 1535    Living Environment    Lives With alone    Living Arrangements house    Provides Primary Care For no one    Quality Of Family Relationships supportive    Able to Return to Prior Living Arrangements yes    Discharge Needs Assessment    Concerns To Be Addressed no discharge needs identified    Readmission Within The Last 30 Days no previous admission in last 30 days    Transportation Available family or friend will provide            Discharge Plan       02/18/17 1535    Case Management/Social Work Plan    Plan Home with home health    Patient/Family In Agreement With Plan yes    Additional Comments Spoke with patient at bedside.  Patient currently resides in [facility].  Patient lives alone but daughter lives within 1 mile and is planning on helping patient after discharge.  DME that patient has at home include chair lift to go up stairs, walker, cane, BSC, raised toilet seat, shower chair and grab bars.  Patient has used home health in the past and thinks they were associated with [facility].  Patient states she would also be agreeable to inpatient rehab at [place].  CM will continue to follow.        Discharge Placement     No information found                Demographic Summary       02/18/17 1534    Referral Information    Admission Type inpatient    Arrived From admitted as an inpatient    Referral Source admission list    Reason For Consult discharge planning    Record Reviewed clinical discipline documentation;history and physical;medical record;patient profile    Contact Information    Permission Granted to Share Information With ;family/designee            Functional Status     None            Psychosocial     None            Abuse/Neglect     None            Legal      None            Substance Abuse     None            Patient Forms     None          Sindy Alva, RN

## 2017-02-18 NOTE — PROGRESS NOTES
"Visit Vitals   • /52   • Pulse 72   • Temp 98.8 °F (37.1 °C) (Oral)   • Resp 16   • Ht 62\" (157.5 cm)   • Wt 162 lb (73.5 kg)   • SpO2 97%   • BMI 29.63 kg/m2       Lab Results (last 24 hours)     Procedure Component Value Units Date/Time    Tissue Exam [[id number]] Collected:  02/17/17 1132    Specimen:  Synovium from Knee, Left Updated:  02/17/17 1341     Case Report --      Surgical Pathology Report                         Case: [id number]                                  Authorizing Provider:  Simon Colvin MD      Collected:           02/17/2017 11:32 AM          Ordering Location:     [facility]   Received:            02/17/2017 11:51 AM                                 OR                                                                           Pathologist:           Angelo Tracy MD                                                            Intraop:               Angelo Tracy MD                                                            Specimens:   1) - Knee, Left, SYNOVIUM OF LEFT KNEE                                                              2) - Knee, Left                                                                             Intraoperative Consultation --      Frozen section: Verbal report given to  via speakerphone on 2/17/2017 at 11:51 AM.  FROZEN SECTION DIAGNOSIS: FS1 Spec \"B\"- left knee tissue - chronic inflammation, no significant acute inflammation (DGD).              POC Glucose Fingerstick [[id number]]  (Abnormal) Collected:  02/17/17 1826    Specimen:  Blood Updated:  02/17/17 1828     Glucose 150 (H) mg/dL     Narrative:       Meter: [id number] : [id number] Jeri SPEAR    POC Glucose Fingerstick [[id number]]  (Abnormal) Collected:  02/17/17 2124    Specimen:  Blood Updated:  02/17/17 2125     Glucose 219 (H) mg/dL     Narrative:       Meter: [id number] : [id number] Cherelle Cardozo    CBC (No Diff) [[id number]]  (Abnormal) Collected:  02/18/17 " 0521    Specimen:  Blood Updated:  02/18/17 0551     WBC 10.53 10*3/mm3      RBC 3.37 (L) 10*6/mm3      Hemoglobin 10.7 (L) g/dL      Hematocrit 32.9 (L) %      MCV 97.6 fL      MCH 31.8 (H) pg      MCHC 32.5 g/dL      RDW 14.0 %      RDW-SD 50.0 fl      MPV 10.6 fL      Platelets 185 10*3/mm3     Basic Metabolic Panel [[id number]]  (Abnormal) Collected:  02/18/17 0521    Specimen:  Blood Updated:  02/18/17 0619     Glucose 175 (H) mg/dL      BUN 11 mg/dL      Creatinine 0.70 mg/dL      Sodium 136 mmol/L      Potassium 4.2 mmol/L      Chloride 103 mmol/L      CO2 29.0 mmol/L      Calcium 8.7 mg/dL      eGFR Non African Amer 80 mL/min/1.73      BUN/Creatinine Ratio 15.7      Anion Gap 4.0 mmol/L     Narrative:       National Kidney [employer] Guidelines    Stage                           Description                             GFR                      1                               Normal or High                          90+  2                               Mild decrease                            60-89  3                               Moderate decrease                   30-59  4                               Severe decrease                       15-29  5                               Kidney failure                             <15    POC Glucose Fingerstick [[id number]]  (Abnormal) Collected:  02/18/17 0655    Specimen:  Blood Updated:  02/18/17 0657     Glucose 144 (H) mg/dL     Narrative:       Meter: [id number] : [id number] Mike Mccall    Tissue Culture [[id number]]  (Normal) Collected:  02/17/17 1132    Specimen:  Synovium from Knee, Left Updated:  02/18/17 1037     Culture No growth at 24 hours      Gram Stain Result Occasional WBCs seen       No organisms seen     POC Glucose Fingerstick [[id number]]  (Abnormal) Collected:  02/18/17 1127    Specimen:  Blood Updated:  02/18/17 1128     Glucose 155 (H) mg/dL     Narrative:       Meter: [id number] : [id number] Mike Mccall          Patient Care  Team:  Anuel Amador MD as PCP - General (Family Medicine)  Lashonda Pimentel MD as PCP - Cardiology (Cardiology)    SUBJECTIVE  No new complaints, pain controlled  PHYSICAL EXAM  Abd: soft/NT/ND  LLE is NVI  Incisions clean, dry, intact  No calf tenderness    Cultures negative so far   Principal Problem:    S/P revision of total replacement of left knee  Active Problems:    Loose total knee arthroplasty    A-fib    HTN (hypertension)    Prediabetes      PLAN / DISPOSITION:  Stable s/p L knee revision  Up with PT  Follow cultures  HHPT    Simon Colvin MD  02/18/17  12:02 PM

## 2017-02-18 NOTE — PLAN OF CARE
Problem: Patient Care Overview (Adult)  Goal: Plan of Care Review  Outcome: Ongoing (interventions implemented as appropriate)    02/18/17 0917   Coping/Psychosocial Response Interventions   Plan Of Care Reviewed With patient   Patient Care Overview   Progress improving   Outcome Evaluation   Outcome Summary/Follow up Plan Pt is 85 y/o WF with evolving signs and symptoms r/t left TKA and will benefit from skilled PT to address deficits in strength, balance, functional mobility, and ROM in order to assist pt in returning home at [facility].          Problem: Perioperative Period (Adult)  Goal: Signs and Symptoms of Listed Potential Problems Will be Absent or Manageable (Perioperative Period)  Outcome: Ongoing (interventions implemented as appropriate)    02/18/17 0917   Perioperative Period   Problems Assessed (Perioperative Period) all   Problems Present (Perioperative Period) pain         Problem: Inpatient Physical Therapy  Goal: Bed Mobility Goal LTG- PT  Outcome: Ongoing (interventions implemented as appropriate)    02/18/17 0917   Bed Mobility PT LTG   Bed Mobility PT LTG, Date Established 02/18/17   Bed Mobility PT LTG, Time to Achieve 5 days   Bed Mobility PT LTG, Activity Type supine to sit/sit to supine   Bed Mobility PT LTG, [place] Level conditional independence   Bed Mobility PT LTG, Date Goal Reviewed 02/18/17   Bed Mobility PT LTG, Outcome goal ongoing       Goal: Transfer Training Goal 1 LTG- PT  Outcome: Ongoing (interventions implemented as appropriate)    02/18/17 0917   Transfer Training PT LTG   Transfer Training PT LTG, Date Established 02/18/17   Transfer Training PT LTG, Time to Achieve 5 days   Transfer Training PT LTG, Activity Type bed to chair /chair to bed;sit to stand/stand to sit   Transfer Training PT LTG, [place] Level supervision required   Transfer Training PT LTG, Assist Device walker, rolling   Transfer Training PT LTG, Date Goal Reviewed 02/18/17   Transfer Training PT LTG,  Outcome goal ongoing       Goal: Gait Training Goal LTG- PT  Outcome: Ongoing (interventions implemented as appropriate)    02/18/17 0917   Gait Training PT LTG   Gait Training Goal PT LTG, Date Established 02/18/17   Gait Training Goal PT LTG, Time to Achieve 5 days   Gait Training Goal PT LTG, [place] Level supervision required   Gait Training Goal PT LTG, Assist Device walker, rolling   Gait Training Goal PT LTG, Distance to Achieve 250   Gait Training Goal PT LTG, Date Goal Reviewed 02/18/17   Gait Training Goal PT LTG, Outcome goal ongoing       Goal: Range of Motion Goal LTG- PT  Outcome: Ongoing (interventions implemented as appropriate)    02/18/17 0917   Range of Motion PT LTG   Range of Motion Goal PT LTG, Date Established 02/18/17   Range of Motion Goal PT LTG, Time to Achieve 5 days   Range fo Motion Goal PT LTG, Joint L knee   Range of Motion Goal PT LTG, AROM Measure 0-110   Range of Motion Goal PT LTG, Date Goal Reviewed 02/18/17   Range of Motion Goal PT LTG, Outcome goal ongoing       Goal: Patient Education Goal LTG- PT  Outcome: Ongoing (interventions implemented as appropriate)    02/18/17 0917   Patient Education PT LTG   Patient Education PT LTG, Date Established 02/18/17   Patient Education PT LTG, Time to Achieve 5 days   Patient Education PT LTG, Education Type HEP;precaution per surgeon;gait;transfers;bed mobility;benefits of activity;pain management   Patient Education PT LTG, Education Understanding demonstrate adequately;verbalize understanding   Patient Education PT LTG, Date Goal Reviewed 02/18/17   Patient Education PT LTG Outcome goal ongoing

## 2017-02-18 NOTE — PROGRESS NOTES
Acute Care - Occupational Therapy Initial Evaluation  [facility]     Patient Name: Radha Ruggiero  : 7/3/1932  MRN: [id number]  Today's Date: 2017  Onset of Illness/Injury or Date of Surgery Date: 17  Date of Referral to OT: 17  Referring Physician: Vinicius    Admit Date: 2017       ICD-10-CM ICD-9-CM   1. Impaired functional mobility, balance, gait, and endurance Z74.09 V49.89   2. Loose total knee arthroplasty T84.038A 996.41    Z96.659      Patient Active Problem List   Diagnosis   • Loose total knee arthroplasty   • S/P revision of total replacement of left knee   • A-fib   • HTN (hypertension)   • Prediabetes     Past Medical History   Diagnosis Date   • Arthritis    • Atrial fibrillation    • Back pain    • Blepharitis, both eyes    • Cancer      left breast   • Glaucoma      right eye   • Hypertension    • PONV (postoperative nausea and vomiting)      TARA WITH MORPHINE   • Wears glasses      Past Surgical History   Procedure Laterality Date   • Breast lumpectomy       left   • Eye surgery     • Total knee arthroplasty Bilateral    • Shoulder arthroscopy       X2   • Kuhn arthroplasty     • Foot surgery Bilateral      BUNIONECTOMY, HAMMER TOE          OT ASSESSMENT FLOWSHEET (last 72 hours)      OT Evaluation       17 0812 17 0810 17 0607 17 0418 17 0147    Rehab Evaluation    Document Type evaluation  - evaluation  -AC       Subjective Information agree to therapy;complains of;pain  - agree to therapy  -AC       Patient Effort, Rehab Treatment good  -JM good  -AC       Symptoms Noted During/After Treatment  --   impulsive  -AC       General Information    Patient Profile Review yes  -JM yes  -AC       Onset of Illness/Injury or Date of Surgery Date 17  -JM 17  -AC       Referring Physician Vinicius  -ELSA Colvin MD  -AC       General Observations Pt lying supine in bed; daughter present  -ELSA Pt received supine in bed sleeping.  Dtr present in room.  -AC       Pertinent History Of Current Problem Pt with loose components of left TKA from 10 years ago; revised left TKA  - Pt S/P L knee revision  -       Precautions/Limitations fall precautions   Left nerve block catheter, impulsive, KI on LLE  - fall precautions   KI when up, nerve cath, impilsive   -AC       Prior Level of Function independent:;all household mobility;community mobility;ADL's;home management   Reports pain in left knee was significant and limited QoL  - independent:;all household mobility;ADL's   increased pain with all activity  -       Equipment Currently Used at Home walker, rolling;commode  - walker, rolling;commode  -       Plans/Goals Discussed With patient  - patient;agreed upon  -       Risks Reviewed patient:;LOB;nausea/vomiting;dizziness;increased discomfort;change in vital signs;increased drainage  - patient:;LOB  -       Benefits Reviewed patient:;improve function;increase strength;increase independence;increase balance;decrease pain;decrease risk of DVT  - patient:;improve function;increase independence;increase strength;increase balance;increase knowledge  -       Barriers to Rehab cognitive status  - cognitive status   pt very impulsive  -       Living Environment    Lives With alone  - alone  -       Living Arrangements house  - house  -       Home Accessibility ramps present at home;bed and bath on same level   Lives on first floor of 2 level home  - bed and bath on same level;ramps present at home  -       Clinical Impression    Date of Referral to OT  02/17/17  -       OT Diagnosis  Decreased independence with self care  -       Patient/Family Goals Statement  Pt would like to return to [facility]  -       Criteria for Skilled Therapeutic Interventions Met  yes;treatment indicated  -       Rehab Potential  good, to achieve stated therapy goals  -AC       Therapy Frequency  daily  -AC       Anticipated Equipment  Needs At Discharge  --   will further assess if pt appropriate to use AE for LBB/LBD  -AC       Anticipated Discharge Disposition  inpatient rehabilitation facility  -       Vital Signs    Pre SpO2 (%) 96  -JM        O2 Delivery Pre Treatment room air  -JM        Post SpO2 (%) 97  -JM        O2 Delivery Post Treatment room air  -JM        Post Patient Position Sitting  -JM        Pain Assessment    Pain Assessment 0-10  -JM 0-10  -AC       Pain Score 4  -JM 4  -AC       Post Pain Score 4  -JM 4  -AC       Pain Type Surgical pain  -JM Surgical pain  -AC       Pain Location Knee  -JM Knee  -AC       Pain Orientation Left  -JM Left  -AC       Pain Intervention(s)  Repositioned  -AC       Response to Interventions  tolerated  -AC       Vision Assessment/Intervention    Visual Impairment  WNL  -AC       Cognitive Assessment/Intervention    Current Cognitive/Communication Assessment impaired  - impaired   [facility]  -       Orientation Status oriented x 4  -JM oriented x 4  -AC       Follows Commands/Answers Questions 25% of the time;needs cueing  - 25% of the time;needs cueing;needs repetition   very poor attn to task  -       Personal Safety moderate impairment;impulsive;decreased insight to deficits;decreased awareness, need for assist;decreased awareness, need for safety  - impulsive;moderate impairment  -       Personal Safety Interventions fall prevention program maintained;gait belt;muscle strengthening facilitated;nonskid shoes/slippers when out of bed  - elopement precautions initiated;fall prevention program maintained;gait belt;nonskid shoes/slippers when out of bed  -       ROM (Range of Motion)    General ROM lower extremity range of motion deficits identified  - no range of motion deficits identified  -       MMT (Manual Muscle Testing)    General MMT Assessment lower extremity strength deficits identified  - no strength deficits identified  -       General MMT Assessment Detail --   RLE  grossly 5/5; LLE grossly 3+/5  -JM        Muscle Tone Assessment    Muscle Tone Assessment   LLE  -KG LLE  -KG LLE  -KG    LLE Muscle Tone Assessment   mildly decreased tone  -KG mildly decreased tone  -KG mildly decreased tone  -KG    Bed Mobility, Assessment/Treatment    Bed Mobility, Assistive Device bed rails  -        Bed Mob, Supine to Sit, [place] contact guard assist;verbal cues required  -JM verbal cues required;contact guard assist   multiple cues for safety  -AC       Bed Mobility, Comment  pt attempting to get to EOB before safe   -AC       Transfer Assessment/Treatment    Transfers, Bed-Chair [place] minimum assist (75% patient effort);verbal cues required;set up required;2 person assist required  -JM        Transfers, Sit-Stand [place] minimum assist (75% patient effort);2 person assist required;verbal cues required  -JM verbal cues required;minimum assist (75% patient effort);2 person assist required  -AC       Transfers, Stand-Sit [place]  verbal cues required;minimum assist (75% patient effort);2 person assist required  -AC       Transfers, Sit-Stand-Sit, Assist Device rolling walker  - rolling walker  -AC       Transfer, Impairments ROM decreased;strength decreased;impaired balance;pain  - strength decreased;impaired balance  -AC       Transfer, Comment --   VCs for safety awareness and hand placement  - --   multiple cues for hand placement, pt impulsive  -AC       Functional Mobility    Functional Mobility- Ind. Level  verbal cues required;minimum assist (75% patient effort);2 person assist required  -AC       Functional Mobility- Device  rolling walker  -AC       Functional Mobility-Distance (Feet)  25  -AC       Functional Mobility- Comment  Pt req multiple cures with use of walekr for safety  -AC       Upper Body Bathing Assessment/Training    UB Bathing Assess/Train, [place] Level  minimum assist (75% patient effort)  -AC       Lower Body Bathing  Assessment/Training    LB Bathing Assess/Train, [place] Level  maximum assist (25% patient effort)  -AC       Upper Body Dressing Assessment/Training    UB Dressing Assess/Train, [place]  minimum assist (75% patient effort)  -AC       Lower Body Dressing Assessment/Training    LB Dressing Assess/Train, [place]  maximum assist (25% patient effort)  -AC       Toileting Assessment/Training    Toileting Assess/Train, [place] Level  minimum assist (75% patient effort)  -AC       Grooming Assessment/Training    Grooming Assess/Train, [place] Level  minimum assist (75% patient effort)  -       Therapy Exercises    Exercise Protocols total knee  -        Total Knee Exercises left:;10 reps;completed protocol   Fair quad contraction present; minimal lag noted with SLR  -        General Therapy Interventions    Planned Therapy Interventions  adaptive equipment training;ADL retraining;balance training;bed mobility training;transfer training  -       Positioning and Restraints    Pre-Treatment Position in bed  - in bed  -       Post Treatment Position chair  - chair  -       In Chair notified nsg;reclined;call light within reach;encouraged to call for assist;exit alarm on;with nsg;LLE elevated  - reclined;call light within reach;encouraged to call for assist;exit alarm on;with nsg  -AC       General LE Assessment    ROM knee, left: LE ROM deficit  -        ROM Detail --   Left knee AROM 0-95   -          02/18/17 0010 02/17/17 2225 02/17/17 2052 02/17/17 1011       General Information    Equipment Currently Used at Home    bath bench;walker, rolling;raised toilet  -TS     Living Environment    Lives With    alone  -TS     Living Arrangements    house  -TS     Home Accessibility    bed and bath on same level;stairs within home   Pt states she has a bed and bath on each floor, states she also has a chair lift.  -TS     Stair Railings at Home    inside, present on left side  -TS     Type of  Financial/Environmental Concern    none  -TS     Transportation Available    car;family or friend will provide  -TS     Functional Level Prior    Ambulation    0-->independent  -TS     Transferring    0-->independent  -TS     Toileting    1-->assistive equipment  -TS     Bathing    0-->independent  -TS     Dressing    0-->independent  -TS     Eating    0-->independent  -TS     Communication    0-->understands/communicates without difficulty  -TS     Swallowing    0-->swallows foods/liquids without difficulty  -TS     Muscle Tone Assessment    Muscle Tone Assessment LLE  -KG LLE  -KG LLE  -KG      LLE Muscle Tone Assessment mildly decreased tone  -KG mildly decreased tone  -KG mildly decreased tone  -KG        User Key  (r) = Recorded By, (t) = Taken By, (c) = Cosigned By    Initials Name Effective Dates     Gely Adams OT 06/23/15 -     ELSA Jorge, PT 06/19/15 -     TS Katharina Lainez RN 06/16/16 -     KG Samira Carcamo, ESHA 06/16/16 -            Occupational Therapy Education     Title: PT OT SLP Therapies (Active)     Topic: Occupational Therapy (Active)     Point: ADL training (Active)    Description: Instruct learner(s) on proper safety adaptation and remediation techniques during self care or transfers.   Instruct in proper use of assistive devices.    Learning Progress Summary    Learner Readiness Method Response Comment Documented by Status   Patient Acceptance E NR Role of OT, safety with BSC transfer and with use of walker  02/18/17 0911 Active                      User Key     Initials Effective Dates Name Provider Type Discipline     06/23/15 -  Gely Adams OT Occupational Therapist OT                  OT Recommendation and Plan  Anticipated Equipment Needs At Discharge:  (will further assess if pt appropriate to use AE for LBB/LBD)  Anticipated Discharge Disposition: inpatient rehabilitation facility  Planned Therapy Interventions: adaptive equipment training, ADL retraining, balance  training, bed mobility training, transfer training  Therapy Frequency: daily  Plan of Care Review  Plan Of Care Reviewed With: patient  Outcome Summary/Follow up Plan: OT eval complete.  Pt presents with impulsivity / poor attn to task and decreased strength and balance.  Pt will benefit from OT to advance pt toward PLOF with ADLs and functional mobility.          OT Goals       02/18/17 0913          Bed Mobility OT LTG    Bed Mobility OT LTG, Date Established 02/18/17  -AC      Bed Mobility OT LTG, Time to Achieve 1 wk  -AC      Bed Mobility OT LTG, Activity Type all bed mobility  -AC      Bed Mobility OT LTG, [place] Level supervision required  -AC      Safety Awareness OT STG    Safety Awareness OT STG, Date Established 02/18/17  -AC      Safety Awareness OT STG, Time to Achieve by discharge  -AC      Safety Awareness OT STG, Activity Type good safety awareness;with ADL's  -AC      LB Dressing OT LTG    LB Dressing Goal OT LTG, Date Established 02/18/17  -AC      LB Dressing Goal OT LTG, Time to Achieve by discharge  -AC      LB Dressing Goal OT LTG, [place] Level moderate assist (50% patient effort)  -AC      LB Dressing Goal OT LTG, Adaptive Equipment --   AE as appropriate  -AC      Functional Mobility OT LTG    Functional Mobility Goal OT LTG, Date Established 02/18/17  -AC      Functional Mobility Goal OT LTG, Time to Achieve by discharge  -AC      Functional Mobility Goal OT LTG, [place] Level contact guard  -AC      Functional Mobility Goal OT LTG, Assist Device rolling walker  -AC      Functional Mobility Goal OT LTG, Distance to Achieve to the bathroom;in hallway  -AC        User Key  (r) = Recorded By, (t) = Taken By, (c) = Cosigned By    Initials Name Provider Type    TIGRE Adams OT Occupational Therapist                Outcome Measures       02/18/17 0812 02/18/17 0810       How much help from another person do you currently need...    Turning from your back to your side while in  flat bed without using bedrails? 3  -JM      Moving from lying on back to sitting on the side of a flat bed without bedrails? 3  -JM      Moving to and from a bed to a chair (including a wheelchair)? 3  -JM      Standing up from a chair using your arms (e.g., wheelchair, bedside chair)? 3  -JM      Climbing 3-5 steps with a railing? 2  -JM      To walk in hospital room? 3  -JM      AM-PAC 6 Clicks Score 17  -JM      How much help from another is currently needed...    Putting on and taking off regular lower body clothing?  2  -AC     Bathing (including washing, rinsing, and drying)  2  -AC     Toileting (which includes using toilet bed pan or urinal)  3  -AC     Putting on and taking off regular upper body clothing  3  -AC     Taking care of personal grooming (such as brushing teeth)  3  -AC     Eating meals  4  -AC     Score  17  -AC     Functional Assessment    Outcome Measure Options AM-PAC 6 Clicks Basic Mobility (PT)  -JM AM-PAC 6 Clicks Daily Activity (OT)  -AC       User Key  (r) = Recorded By, (t) = Taken By, (c) = Cosigned By    Initials Name Provider Type    AC Gely Adams OT Occupational Therapist    ELSA Jorge, PT Physical Therapist          Time Calculation:   OT Start Time: 0810    Therapy Charges for Today     Code Description Service Date Service Provider Modifiers Qty    [id number]  OT EVAL MOD COMPLEXITY 4 2/18/2017 Gely Adams OT GO 1               Gely Adams OT  2/18/2017

## 2017-02-19 LAB
GLUCOSE BLDC GLUCOMTR-MCNC: 108 MG/DL (ref 70–130)
GLUCOSE BLDC GLUCOMTR-MCNC: 109 MG/DL (ref 70–130)
GLUCOSE BLDC GLUCOMTR-MCNC: 116 MG/DL (ref 70–130)
GLUCOSE BLDC GLUCOMTR-MCNC: 147 MG/DL (ref 70–130)
HCT VFR BLD AUTO: 30.1 % (ref 34.5–44)
HGB BLD-MCNC: 9.6 G/DL (ref 11.5–15.5)

## 2017-02-19 PROCEDURE — 85014 HEMATOCRIT: CPT | Performed by: ORTHOPAEDIC SURGERY

## 2017-02-19 PROCEDURE — 85018 HEMOGLOBIN: CPT | Performed by: ORTHOPAEDIC SURGERY

## 2017-02-19 PROCEDURE — 25010000002 ROPIVACAINE PER 1 MG: Performed by: NURSE ANESTHETIST, CERTIFIED REGISTERED

## 2017-02-19 PROCEDURE — 97110 THERAPEUTIC EXERCISES: CPT | Performed by: PHYSICAL THERAPIST

## 2017-02-19 PROCEDURE — 97116 GAIT TRAINING THERAPY: CPT | Performed by: PHYSICAL THERAPIST

## 2017-02-19 PROCEDURE — 82962 GLUCOSE BLOOD TEST: CPT

## 2017-02-19 PROCEDURE — 25010000002 ENOXAPARIN PER 10 MG: Performed by: ORTHOPAEDIC SURGERY

## 2017-02-19 RX ORDER — HYDROCODONE BITARTRATE AND ACETAMINOPHEN 10; 325 MG/1; MG/1
1 TABLET ORAL 2 TIMES DAILY PRN
Qty: 41 TABLET | Refills: 0 | Status: SHIPPED | OUTPATIENT
Start: 2017-02-19

## 2017-02-19 RX ADMIN — POLYETHYLENE GLYCOL 3350 17 G: 17 POWDER, FOR SOLUTION ORAL at 08:35

## 2017-02-19 RX ADMIN — DOCUSATE SODIUM AND SENNOSIDES 2 TABLET: 8.6; 5 TABLET, FILM COATED ORAL at 08:35

## 2017-02-19 RX ADMIN — ROPIVACAINE HYDROCHLORIDE 10 ML/HR: 2 INJECTION, SOLUTION EPIDURAL; INFILTRATION at 04:31

## 2017-02-19 RX ADMIN — HYDROCODONE BITARTRATE AND ACETAMINOPHEN 1 TABLET: 7.5; 325 TABLET ORAL at 08:35

## 2017-02-19 RX ADMIN — HYDROCODONE BITARTRATE AND ACETAMINOPHEN 1 TABLET: 7.5; 325 TABLET ORAL at 20:05

## 2017-02-19 RX ADMIN — AMIODARONE HYDROCHLORIDE 100 MG: 200 TABLET ORAL at 08:35

## 2017-02-19 RX ADMIN — SERTRALINE HYDROCHLORIDE 50 MG: 50 TABLET ORAL at 08:35

## 2017-02-19 RX ADMIN — DOCUSATE SODIUM AND SENNOSIDES 2 TABLET: 8.6; 5 TABLET, FILM COATED ORAL at 18:02

## 2017-02-19 RX ADMIN — ATORVASTATIN CALCIUM 40 MG: 40 TABLET, FILM COATED ORAL at 20:04

## 2017-02-19 RX ADMIN — OXYBUTYNIN CHLORIDE 5 MG: 5 TABLET, EXTENDED RELEASE ORAL at 08:35

## 2017-02-19 RX ADMIN — ENOXAPARIN SODIUM 40 MG: 40 INJECTION SUBCUTANEOUS at 08:35

## 2017-02-19 RX ADMIN — HYDROCODONE BITARTRATE AND ACETAMINOPHEN 1 TABLET: 7.5; 325 TABLET ORAL at 15:30

## 2017-02-19 RX ADMIN — LEVOTHYROXINE SODIUM 112 MCG: 112 TABLET ORAL at 04:23

## 2017-02-19 RX ADMIN — ROPIVACAINE HYDROCHLORIDE 10 ML/HR: 2 INJECTION, SOLUTION EPIDURAL; INFILTRATION at 20:57

## 2017-02-19 RX ADMIN — PANTOPRAZOLE SODIUM 40 MG: 40 TABLET, DELAYED RELEASE ORAL at 04:23

## 2017-02-19 RX ADMIN — HYDROCODONE BITARTRATE AND ACETAMINOPHEN 1 TABLET: 7.5; 325 TABLET ORAL at 04:19

## 2017-02-19 NOTE — PROGRESS NOTES
"Acute Care - Physical Therapy Treatment Note  [facility]     Patient Name: Radha Ruggiero  : 7/3/1932  MRN: [id number]  Today's Date: 2017  Onset of Illness/Injury or Date of Surgery Date: 17  Date of Referral to PT: 17  Referring Physician: Vinicius    Admit Date: 2017    Visit Dx:    ICD-10-CM ICD-9-CM   1. Impaired functional mobility, balance, gait, and endurance Z74.09 V49.89   2. Loose total knee arthroplasty T84.038A 996.41    Z96.659      Patient Active Problem List   Diagnosis   • Loose total knee arthroplasty   • S/P revision of total replacement of left knee   • A-fib   • HTN (hypertension)   • Prediabetes               Adult Rehabilitation Note       17 1410 17 0813 17 1503    Rehab Assessment/Intervention    Discipline physical therapist  - physical therapist  - physical therapist  -    Document Type therapy note (daily note)  - therapy note (daily note)  - therapy note (daily note)  -    Subjective Information agree to therapy;no complaints   \"I feel better\"  - agree to therapy;complains of;pain;nausea/vomiting  - agree to therapy;no complaints  -    Patient Effort, Rehab Treatment good  -JM good  -JM good  -    Precautions/Limitations fall precautions   KI when up, nerve cath  - fall precautions   KI when up, nerve block catheter, impulsive  - fall precautions   Nerve block catheter; KI when up; impulsive  -JM    Recorded by [JM] Pedro Jorge, PT [JM] Pedro Jorge, PT [JM] Pedro Jorge, PT    Pain Assessment    Pain Assessment 0-10  -JM 0-10  -JM 0-10  -JM    Pain Score 4  -JM 4  -JM 2  -JM    Post Pain Score 4  -JM 5  -JM 2  -JM    Pain Type Surgical pain  -JM Surgical pain  -JM Surgical pain  -    Pain Location Knee  -JM Knee  -JM Knee  -JM    Pain Orientation Left  -JM Left  -JM Left  -JM    Pain Intervention(s) Medication (See MAR)  - Medication (See MAR);Repositioned  -     Response to Interventions Tolerated  - " Tolerated  -JM     Recorded by [JM] Pedro Jorge, PT [JM] Pedro Jorge, PT [JM] Pedro Jorge, PT    Cognitive Assessment/Intervention    Current Cognitive/Communication Assessment functional  -JM impaired  -JM impaired  -JM    Orientation Status oriented x 4  -JM oriented x 4  -JM oriented x 4  -JM    Follows Commands/Answers Questions 100% of the time  -JM 75% of the time  -JM 50% of the time  -JM    Personal Safety WNL/WFL  -JM mild impairment  -JM moderate impairment;decreased awareness, need for assist;decreased awareness, need for safety;decreased insight to deficits  -    Personal Safety Interventions fall prevention program maintained;gait belt;muscle strengthening facilitated;nonskid shoes/slippers when out of bed  - fall prevention program maintained;gait belt;muscle strengthening facilitated;nonskid shoes/slippers when out of bed  -JM fall prevention program maintained;gait belt;muscle strengthening facilitated;nonskid shoes/slippers when out of bed  -JM    Recorded by [ELSA] Pedro Jorge, PT [JM] Pedro Jorge, PT [JM] Pedro Jorge, PT    ROM (Range of Motion)    General ROM Detail  -1 PROM knee extension; 0 AROM knee extension; 98 AROM knee flexion  -     Recorded by  [JM] Pedro Jorge, PT     Bed Mobility, Assessment/Treatment    Bed Mobility, Assistive Device head of bed elevated  -JM head of bed elevated  -JM head of bed elevated  -JM    Bed Mob, Supine to Sit, [place] supervision required  -JM minimum assist (75% patient effort)  -JM contact guard assist  -    Bed Mobility, Impairments  strength decreased   Pt more weak getting up for first time this AM  -JM     Recorded by [ELSA] Pedro Jorge, PT [JM] Pedro Jorge, PT [JM] Pedro Jorge, PT    Transfer Assessment/Treatment    Transfers, Bed-Chair [place]  contact guard assist  -JM     Transfers, Bed-Chair-Bed, Assist Device  rolling walker  -     Transfers, Sit-Stand [place] contact guard assist   Improved safety  awareness  -JM contact guard assist;verbal cues required  -JM minimum assist (75% patient effort);verbal cues required  -JM    Transfers, Stand-Sit [place] contact guard assist;verbal cues required   VC to slide LLE forward when sitting  - contact guard assist;verbal cues required  -JM     Transfers, Sit-Stand-Sit, Assist Device rolling walker  -JM rolling walker  -JM rolling walker  -JM    Toilet Transfer, [place]  minimum assist (75% patient effort)  -     Toilet Transfer, Assistive Device  rolling walker  -JM     Transfer, Comment   --   Impulsive, VCs for hand placement, and safe technique  -    Recorded by [ELSA] Pedro Jorge, PT [JM] Pedro Jorge, PT [JM] Pedro Jorge, PT    Gait Assessment/Treatment    Gait, [place] Level contact guard assist  - contact guard assist  - contact guard assist;verbal cues required  -    Gait, Assistive Device rolling walker  -JM rolling walker  -JM rolling walker  -JM    Gait, Distance (Feet) 60  -JM 30  -JM 45  -JM    Gait, Gait Pattern Analysis swing-to gait  -JM swing-to gait  -JM swing-to gait  -    Gait, Gait Deviations antalgic;decreased heel strike;forward flexed posture;limb motion velocity decreased;step length decreased  - antalgic;lola decreased;decreased heel strike  - forward flexed posture;antalgic;limb motion velocity decreased   Shuffling to advance feet  -    Gait, Comment --   Gait distance limited by bowel urgency  - Gait distance limited by nausea today  -     Recorded by [ELSA] Pedro Jorge, PT [JM] Pedro Jorge, PT [JM] Pedro Jorge, PT    Therapy Exercises    Exercise Protocols  total knee  - total knee  -    Total Knee Exercises  left:;15 reps;completed protocol;with assist;ankle pumps/circles;quad set;heel slides;SLR;SAQ  - left:;10 reps;completed protocol;with assist;ankle pumps/circles;quad set;glut set;heel slides;SLR;SAQ;hip abduction/adduction  -    Recorded by  [JM] Pedro Jorge, PT [JM] Pedro ROGER  Mostad, PT    Positioning and Restraints    Pre-Treatment Position in bed  -JM in bed  -JM in bed  -JM    Post Treatment Position bsc  - chair  - chair  -JM    In Chair  notified nsg;reclined;call light within reach;encouraged to call for assist;exit alarm on;legs elevated   nurse removed JAYSHREE hose  -JM notified nsg;reclined;call light within reach;encouraged to call for assist;exit alarm on;LLE elevated  -JM    On BS commode notified nsg;sitting;call light within reach;encouraged to call for assist;L knee immobilizer   PCT notified; call bell in hand  -JM      Recorded by [JM] Pedro Jorge, PT [JM] Pedro Jorge, PT [JM] Pedro Jorge, PT      User Key  (r) = Recorded By, (t) = Taken By, (c) = Cosigned By    Initials Name Effective Dates     Pedro Jorge, PT 06/19/15 -                 IP PT Goals       02/19/17 1436 02/19/17 0902 02/18/17 1532    Bed Mobility PT LTG    Bed Mobility PT LTG, Date Goal Reviewed 02/19/17  -JM 02/19/17  -JM 02/18/17  -    Bed Mobility PT LTG, Outcome goal ongoing  - goal ongoing  - goal ongoing  -    Transfer Training PT LTG    Transfer Training PT  LTG, Date Goal Reviewed 02/19/17  -JM 02/19/17  -JM 02/18/17  -    Transfer Training PT LTG, Outcome goal ongoing  - goal ongoing  - goal ongoing  -    Gait Training PT LTG    Gait Training Goal PT LTG, Date Goal Reviewed 02/19/17  -JM 02/19/17  -JM 02/18/17  -    Gait Training Goal PT LTG, Outcome goal ongoing  - goal ongoing  - goal ongoing  -    Range of Motion PT LTG    Range of Motion Goal PT LTG, Date Goal Reviewed 02/19/17  -JM 02/19/17  -JM 02/18/17  -    Range of Motion Goal PT LTG, Outcome goal ongoing  - goal ongoing  - goal ongoing  -    Patient Education PT LTG    Patient Education PT LTG, Date Goal Reviewed 02/19/17  -JM 02/19/17  -JM 02/18/17  -    Patient Education PT LTG Outcome goal ongoing  -ELSA goal ongoing  -ELSA goal ongoing  -ELSA      02/18/17 0917          Bed Mobility PT LTG     Bed Mobility PT LTG, Date Established 02/18/17  -      Bed Mobility PT LTG, Time to Achieve 5 days  -JM      Bed Mobility PT LTG, Activity Type supine to sit/sit to supine  -      Bed Mobility PT LTG, [place] Level conditional independence  -JM      Bed Mobility PT LTG, Date Goal Reviewed 02/18/17  -      Bed Mobility PT LTG, Outcome goal ongoing  -      Transfer Training PT LTG    Transfer Training PT LTG, Date Established 02/18/17  -      Transfer Training PT LTG, Time to Achieve 5 days  -JM      Transfer Training PT LTG, Activity Type bed to chair /chair to bed;sit to stand/stand to sit  -      Transfer Training PT LTG, [place] Level supervision required  -JM      Transfer Training PT LTG, Assist Device walker, rolling  -JM      Transfer Training PT  LTG, Date Goal Reviewed 02/18/17  -      Transfer Training PT LTG, Outcome goal ongoing  -      Gait Training PT LTG    Gait Training Goal PT LTG, Date Established 02/18/17  -      Gait Training Goal PT LTG, Time to Achieve 5 days  -JM      Gait Training Goal PT LTG, [place] Level supervision required  -      Gait Training Goal PT LTG, Assist Device walker, rolling  -JM      Gait Training Goal PT LTG, Distance to Achieve 250  -JM      Gait Training Goal PT LTG, Date Goal Reviewed 02/18/17  -      Gait Training Goal PT LTG, Outcome goal ongoing  -      Range of Motion PT LTG    Range of Motion Goal PT LTG, Date Established 02/18/17  -      Range of Motion Goal PT LTG, Time to Achieve 5 days  -JM      Range fo Motion Goal PT LTG, Joint L knee  -JM      Range of Motion Goal PT LTG, AROM Measure 0-110  -JM      Range of Motion Goal PT LTG, Date Goal Reviewed 02/18/17  -      Range of Motion Goal PT LTG, Outcome goal ongoing  -      Patient Education PT LTG    Patient Education PT LTG, Date Established 02/18/17  -      Patient Education PT LTG, Time to Achieve 5 days  -JM      Patient Education PT LTG, Education Type  HEP;precaution per surgeon;gait;transfers;bed mobility;benefits of activity;pain management  -      Patient Education PT LTG, Education Understanding demonstrate adequately;verbalize understanding  -      Patient Education PT LTG, Date Goal Reviewed 02/18/17  -      Patient Education PT LTG Outcome goal ongoing  -        User Key  (r) = Recorded By, (t) = Taken By, (c) = Cosigned By    Initials Name Provider Type    ELSA Jorge, PT Physical Therapist          Physical Therapy Education     Title: PT OT SLP Therapies (Active)     Topic: Physical Therapy (Done)     Point: Mobility training (Done)    Learning Progress Summary    Learner Readiness Method Response Comment Documented by Status   Patient Acceptance E VU,NR   02/19/17 1436 Done    Acceptance E VU,NR   02/19/17 0901 Done    Acceptance E VU,MARY CARMEN   02/18/17 1532 Done    Acceptance E VU,MARY CARMEN   02/18/17 0915 Done               Point: Home exercise program (Done)    Learning Progress Summary    Learner Readiness Method Response Comment Documented by Status   Patient Acceptance E VU,NR   02/19/17 1436 Done    Acceptance E VU,NR   02/19/17 0901 Done    Acceptance E VU,MARY CARMEN   02/18/17 1532 Done    Acceptance E VU,NR   02/18/17 0915 Done               Point: Body mechanics (Done)    Learning Progress Summary    Learner Readiness Method Response Comment Documented by Status   Patient Acceptance E VU,NR   02/19/17 1436 Done    Acceptance E VU,NR   02/19/17 0901 Done    Acceptance E VU,NR   02/18/17 1532 Done    Acceptance E VU,NR   02/18/17 0915 Done               Point: Precautions (Done)    Learning Progress Summary    Learner Readiness Method Response Comment Documented by Status   Patient Acceptance E VU,NR   02/19/17 1436 Done    Acceptance E VU,NR   02/19/17 0901 Done    Acceptance E VU,MARY CARMEN   02/18/17 1532 Done    Acceptance E VU,MARY CARMEN   02/18/17 0915 Done                      User Key     Initials Effective Dates Name Provider  Type Discipline     06/19/15 -  Pedro Jorge, PT Physical Therapist PT                    PT Recommendation and Plan  Anticipated Discharge Disposition: home with home health, home with assist  Planned Therapy Interventions: balance training, bed mobility training, gait training, home exercise program, patient/family education, strengthening, transfer training  PT Frequency: 2 times/day, per priority policy  Plan of Care Review  Plan Of Care Reviewed With: patient  Progress: improving  Outcome Summary/Follow up Plan: Pt demonstrating increased gait distance (x60') and independence with bed mobility (supervision).  Much improved safety awareness.          Outcome Measures       02/19/17 1410 02/19/17 0813 02/18/17 1503    How much help from another person do you currently need...    Turning from your back to your side while in flat bed without using bedrails? 3  -JM 3  -JM 3  -JM    Moving from lying on back to sitting on the side of a flat bed without bedrails? 3  -JM 3  -JM 3  -JM    Moving to and from a bed to a chair (including a wheelchair)? 3  -JM 3  -JM 3  -JM    Standing up from a chair using your arms (e.g., wheelchair, bedside chair)? 3  -JM 3  -JM 3  -JM    Climbing 3-5 steps with a railing? 2  -JM 2  -JM 2  -JM    To walk in hospital room? 3  -JM 3  -JM 3  -JM    AM-PAC 6 Clicks Score 17  -JM 17  -JM 17  -JM    Functional Assessment    Outcome Measure Options AM-PAC 6 Clicks Basic Mobility (PT)  - AM-PAC 6 Clicks Basic Mobility (PT)  - AM-PAC 6 Clicks Basic Mobility (PT)  -      02/18/17 0812 02/18/17 0810       How much help from another person do you currently need...    Turning from your back to your side while in flat bed without using bedrails? 3  -JM      Moving from lying on back to sitting on the side of a flat bed without bedrails? 3  -JM      Moving to and from a bed to a chair (including a wheelchair)? 3  -JM      Standing up from a chair using your arms (e.g., wheelchair, bedside  chair)? 3  -JM      Climbing 3-5 steps with a railing? 2  -JM      To walk in hospital room? 3  -JM      AM-PAC 6 Clicks Score 17  -JM      How much help from another is currently needed...    Putting on and taking off regular lower body clothing?  2  -AC     Bathing (including washing, rinsing, and drying)  2  -AC     Toileting (which includes using toilet bed pan or urinal)  3  -AC     Putting on and taking off regular upper body clothing  3  -AC     Taking care of personal grooming (such as brushing teeth)  3  -AC     Eating meals  4  -AC     Score  17  -AC     Functional Assessment    Outcome Measure Options AM-PAC 6 Clicks Basic Mobility (PT)  - AM-PAC 6 Clicks Daily Activity (OT)  -       User Key  (r) = Recorded By, (t) = Taken By, (c) = Cosigned By    Initials Name Provider Type    AC Gely Adams, OT Occupational Therapist    ELSA Jorge, PT Physical Therapist           Time Calculation:         PT Charges       02/19/17 1440 02/19/17 0905       Time Calculation    Start Time 1410  - 0813  -     PT Received On 02/19/17  - 02/19/17  -     PT Goal Re-Cert Due Date 02/28/17  - 02/28/17  -     Time Calculation- PT    Total Timed Code Minutes- PT 18 minute(s)  -JM 38 minute(s)  -       User Key  (r) = Recorded By, (t) = Taken By, (c) = Cosigned By    Initials Name Provider Type     Pedro Jorge, PT Physical Therapist          Therapy Charges for Today     Code Description Service Date Service Provider Modifiers Qty    68972352163 HC PT EVAL MOD COMPLEXITY 3 2/18/2017 Pedro Jorge, PT GP 1    09385946027 HC PT THER PROC EA 15 MIN 2/18/2017 Pedro Jorge, PT GP 1    43963307800 HC PT THER PROC EA 15 MIN 2/18/2017 Pedro Jorge, PT GP 1    86663446400 HC GAIT TRAINING EA 15 MIN 2/18/2017 Pedro Jorge, PT GP 1    71806574490 HC PT THER SUPP EA 15 MIN 2/18/2017 Pedro Jorge, PT GP 2    16569794029 HC GAIT TRAINING EA 15 MIN 2/19/2017 Pedro Jorge, PT GP 1    86907674035 HC PT THER PROC  EA 15 MIN 2/19/2017 Pedro Jorge, PT GP 2    24810234786 HC PT THER SUPP EA 15 MIN 2/19/2017 Pedro Jorge, PT GP 2    33279435034 HC GAIT TRAINING EA 15 MIN 2/19/2017 Pedro Jorge, PT GP 1    02042997111 HC PT THER SUPP EA 15 MIN 2/19/2017 Pedro Jorge, PT GP 1          PT G-Codes  Outcome Measure Options: AM-PAC 6 Clicks Basic Mobility (PT)    Pedro Jorge, PT  2/19/2017

## 2017-02-19 NOTE — PLAN OF CARE
Problem: Patient Care Overview (Adult)  Goal: Plan of Care Review  Outcome: Ongoing (interventions implemented as appropriate)    02/19/17 0902   Coping/Psychosocial Response Interventions   Plan Of Care Reviewed With patient   Patient Care Overview   Progress improving   Outcome Evaluation   Outcome Summary/Follow up Plan Pt with improved command following, increased IND with transfers; gait limited by nausea today; left knee AROM 0-98         Problem: Inpatient Physical Therapy  Goal: Bed Mobility Goal LTG- PT  Outcome: Ongoing (interventions implemented as appropriate)    02/18/17 0917 02/19/17 0902   Bed Mobility PT LTG   Bed Mobility PT LTG, Date Established 02/18/17 --    Bed Mobility PT LTG, Time to Achieve 5 days --    Bed Mobility PT LTG, Activity Type supine to sit/sit to supine --    Bed Mobility PT LTG, [place] Level conditional independence --    Bed Mobility PT LTG, Date Goal Reviewed --  02/19/17   Bed Mobility PT LTG, Outcome --  goal ongoing       Goal: Transfer Training Goal 1 LTG- PT  Outcome: Ongoing (interventions implemented as appropriate)    02/18/17 0917 02/19/17 0902   Transfer Training PT LTG   Transfer Training PT LTG, Date Established 02/18/17 --    Transfer Training PT LTG, Time to Achieve 5 days --    Transfer Training PT LTG, Activity Type bed to chair /chair to bed;sit to stand/stand to sit --    Transfer Training PT LTG, [place] Level supervision required --    Transfer Training PT LTG, Assist Device walker, rolling --    Transfer Training PT LTG, Date Goal Reviewed --  02/19/17   Transfer Training PT LTG, Outcome --  goal ongoing       Goal: Gait Training Goal LTG- PT  Outcome: Ongoing (interventions implemented as appropriate)    02/18/17 0917 02/19/17 0902   Gait Training PT LTG   Gait Training Goal PT LTG, Date Established 02/18/17 --    Gait Training Goal PT LTG, Time to Achieve 5 days --    Gait Training Goal PT LTG, [place] Level supervision required --    Gait  Training Goal PT LTG, Assist Device walker, rolling --    Gait Training Goal PT LTG, Distance to Achieve 250 --    Gait Training Goal PT LTG, Date Goal Reviewed --  02/19/17   Gait Training Goal PT LTG, Outcome --  goal ongoing       Goal: Range of Motion Goal LTG- PT  Outcome: Ongoing (interventions implemented as appropriate)    02/18/17 0917 02/19/17 0902   Range of Motion PT LTG   Range of Motion Goal PT LTG, Date Established 02/18/17 --    Range of Motion Goal PT LTG, Time to Achieve 5 days --    Range fo Motion Goal PT LTG, Joint L knee --    Range of Motion Goal PT LTG, AROM Measure 0-110 --    Range of Motion Goal PT LTG, Date Goal Reviewed --  02/19/17   Range of Motion Goal PT LTG, Outcome --  goal ongoing       Goal: Patient Education Goal LTG- PT  Outcome: Ongoing (interventions implemented as appropriate)    02/18/17 0917 02/19/17 0902   Patient Education PT LTG   Patient Education PT LTG, Date Established 02/18/17 --    Patient Education PT LTG, Time to Achieve 5 days --    Patient Education PT LTG, Education Type HEP;precaution per surgeon;gait;transfers;bed mobility;benefits of activity;pain management --    Patient Education PT LTG, Education Understanding demonstrate adequately;verbalize understanding --    Patient Education PT LTG, Date Goal Reviewed --  02/19/17   Patient Education PT LTG Outcome --  goal ongoing

## 2017-02-19 NOTE — PROGRESS NOTES
"Visit Vitals   • /76   • Pulse 62   • Temp 95.6 °F (35.3 °C) (Tympanic)   • Resp 18   • Ht 62\" (157.5 cm)   • Wt 162 lb (73.5 kg)   • SpO2 98%   • BMI 29.63 kg/m2       Lab Results (last 24 hours)     Procedure Component Value Units Date/Time    Tissue Culture [[id number]]  (Normal) Collected:  02/17/17 1132    Specimen:  Synovium from Knee, Left Updated:  02/18/17 1037     Culture No growth at 24 hours      Gram Stain Result Occasional WBCs seen       No organisms seen     POC Glucose Fingerstick [[id number]]  (Abnormal) Collected:  02/18/17 1127    Specimen:  Blood Updated:  02/18/17 1128     Glucose 155 (H) mg/dL     Narrative:       Meter: [id number] : [id number] Mike Mccall    POC Glucose Fingerstick [[id number]]  (Abnormal) Collected:  02/18/17 1557    Specimen:  Blood Updated:  02/18/17 1558     Glucose 131 (H) mg/dL     Narrative:       Meter: [id number] : [id number] Mike Mccall    POC Glucose Fingerstick [[id number]]  (Normal) Collected:  02/18/17 2025    Specimen:  Blood Updated:  02/18/17 2026     Glucose 122 mg/dL     Narrative:       Meter: [id number] : [id number] Diann Armenta    Hemoglobin & Hematocrit, Blood [[id number]]  (Abnormal) Collected:  02/19/17 0511    Specimen:  Blood Updated:  02/19/17 0603     Hemoglobin 9.6 (L) g/dL      Hematocrit 30.1 (L) %     POC Glucose Fingerstick [[id number]]  (Normal) Collected:  02/19/17 0748    Specimen:  Blood Updated:  02/19/17 0751     Glucose 109 mg/dL     Narrative:       Meter: [id number] : [id number] Noel Edwards          Patient Care Team:  Anuel Amador MD as PCP - General (Family Medicine)  Lashonda Pimentel MD as PCP - Cardiology (Cardiology)    SUBJECTIVE  No new complaints, pain controlled  PHYSICAL EXAM  Abd: soft/NT/ND  LLE is NVI  Incisions clean, dry, intact  No calf tenderness    Culture negative   Principal Problem:    S/P revision of total replacement of left knee  Active Problems:    Loose total knee " arthroplasty    A-fib    HTN (hypertension)    Prediabetes      PLAN / DISPOSITION:  Stable s/p L revision TKA  Up with PT  Patient would benefit from rehab placement.  She has limited home support and is progressing slowly with PT      Simon Colvin MD  02/19/17  10:10 AM

## 2017-02-19 NOTE — PROGRESS NOTES
Continued Stay [facility]  [facility]     Patient Name: Radha Ruggiero  MRN: [id number]  Today's Date: 2/19/2017    Admit Date: 2/17/2017          Discharge Plan       02/19/17 1157    Case Management/Social Work Plan    Plan [place]    Additional Comments Called referral to [facility] ([phone]) at [place] in [facility].  Patient requests inpatient rehab here.  She said she will not have any family to stay with her when discharged.  Faxed referral to [phone].  CM will need to follow up with Jamie Monday morning regarding referral.  Son-in-law (Ray) will transport when patient is medically ready to go.                  Discharge Codes     None            Jessica Quintero

## 2017-02-19 NOTE — PROGRESS NOTES
Acute Care - Physical Therapy Treatment Note  [facility]     Patient Name: Radha Ruggiero  : 7/3/1932  MRN: [id number]  Today's Date: 2017  Onset of Illness/Injury or Date of Surgery Date: 17  Date of Referral to PT: 17  Referring Physician: Vinicius    Admit Date: 2017    Visit Dx:    ICD-10-CM ICD-9-CM   1. Impaired functional mobility, balance, gait, and endurance Z74.09 V49.89   2. Loose total knee arthroplasty T84.038A 996.41    Z96.659      Patient Active Problem List   Diagnosis   • Loose total knee arthroplasty   • S/P revision of total replacement of left knee   • A-fib   • HTN (hypertension)   • Prediabetes               Adult Rehabilitation Note       17 0813 17 1503       Rehab Assessment/Intervention    Discipline physical therapist  - physical therapist  -     Document Type therapy note (daily note)  - therapy note (daily note)  -     Subjective Information agree to therapy;complains of;pain;nausea/vomiting  - agree to therapy;no complaints  -     Patient Effort, Rehab Treatment good  -JM good  -     Precautions/Limitations fall precautions   KI when up, nerve block catheter, impulsive  -JM fall precautions   Nerve block catheter; KI when up; impulsive  -JM     Recorded by [JM] Pedro Jorge, PT [JM] Pedro Jorge, PT     Pain Assessment    Pain Assessment 0-10  -JM 0-10  -JM     Pain Score 4  -JM 2  -JM     Post Pain Score 5  -JM 2  -JM     Pain Type Surgical pain  -JM Surgical pain  -JM     Pain Location Knee  -JM Knee  -JM     Pain Orientation Left  -JM Left  -JM     Pain Intervention(s) Medication (See MAR);Repositioned  -JM      Response to Interventions Tolerated  -JM      Recorded by [JM] Pedro Jorge, PT [JM] Pedro Jorge, PT     Cognitive Assessment/Intervention    Current Cognitive/Communication Assessment impaired  -JM impaired  -JM     Orientation Status oriented x 4  -JM oriented x 4  -JM     Follows Commands/Answers Questions 75%  of the time  - 50% of the time  -     Personal Safety mild impairment  - moderate impairment;decreased awareness, need for assist;decreased awareness, need for safety;decreased insight to deficits  -     Personal Safety Interventions fall prevention program maintained;gait belt;muscle strengthening facilitated;nonskid shoes/slippers when out of bed  - fall prevention program maintained;gait belt;muscle strengthening facilitated;nonskid shoes/slippers when out of bed  -     Recorded by [JM] Pedro Jorge, PT [JM] Pedro Jorge, PT     ROM (Range of Motion)    General ROM Detail -1 PROM knee extension; 0 AROM knee extension; 98 AROM knee flexion  -      Recorded by [JM] Pedro Jorge, PT      Bed Mobility, Assessment/Treatment    Bed Mobility, Assistive Device head of bed elevated  - head of bed elevated  -     Bed Mob, Supine to Sit, [place] minimum assist (75% patient effort)  - contact guard assist  -     Bed Mobility, Impairments strength decreased   Pt more weak getting up for first time this AM  -      Recorded by [JM] Pedro Jorge, PT [JM] Pedro Jorge, PT     Transfer Assessment/Treatment    Transfers, Bed-Chair [place] contact guard assist  -      Transfers, Bed-Chair-Bed, Assist Device rolling walker  -      Transfers, Sit-Stand [place] contact guard assist;verbal cues required  - minimum assist (75% patient effort);verbal cues required  -     Transfers, Stand-Sit [place] contact guard assist;verbal cues required  -      Transfers, Sit-Stand-Sit, Assist Device rolling walker  - rolling walker  -     Toilet Transfer, [place] minimum assist (75% patient effort)  -      Toilet Transfer, Assistive Device rolling walker  -      Transfer, Comment  --   Impulsive, VCs for hand placement, and safe technique  -     Recorded by [JM] Pedro Jorge, PT [JM] Pedro Jorge, PT     Gait Assessment/Treatment    Gait, [place] Level contact guard  assist  - contact guard assist;verbal cues required  -     Gait, Assistive Device rolling walker  - rolling walker  -     Gait, Distance (Feet) 30  -JM 45  -JM     Gait, Gait Pattern Analysis swing-to gait  - swing-to gait  -     Gait, Gait Deviations antalgic;lola decreased;decreased heel strike  - forward flexed posture;antalgic;limb motion velocity decreased   Shuffling to advance feet  -     Gait, Comment Gait distance limited by nausea today  -      Recorded by [ELSA] Pedro Jorge, PT [ELSA] Pedro Jorge, PT     Therapy Exercises    Exercise Protocols total knee  - total knee  -     Total Knee Exercises left:;15 reps;completed protocol;with assist;ankle pumps/circles;quad set;heel slides;SLR;SAQ  - left:;10 reps;completed protocol;with assist;ankle pumps/circles;quad set;glut set;heel slides;SLR;SAQ;hip abduction/adduction  -     Recorded by [ELSA] Pedro Jorge, PT [JM] Pedro Jorge, PT     Positioning and Restraints    Pre-Treatment Position in bed  - in bed  -     Post Treatment Position chair  - chair  -     In Chair notified nsg;reclined;call light within reach;encouraged to call for assist;exit alarm on;legs elevated   nurse removed JAYSHREE hose  - notified nsg;reclined;call light within reach;encouraged to call for assist;exit alarm on;LLE elevated  -     Recorded by [ELSA] Pedro Jorge, PT [JM] Pedro Jorge, PT       User Key  (r) = Recorded By, (t) = Taken By, (c) = Cosigned By    Initials Name Effective Dates    ELSA Jorge, PT 06/19/15 -                 IP PT Goals       02/19/17 0902 02/18/17 1532 02/18/17 0917    Bed Mobility PT LTG    Bed Mobility PT LTG, Date Established   02/18/17  -    Bed Mobility PT LTG, Time to Achieve   5 days  -    Bed Mobility PT LTG, Activity Type   supine to sit/sit to supine  -    Bed Mobility PT LTG, [place] Level   conditional independence  -    Bed Mobility PT LTG, Date Goal Reviewed 02/19/17  - 02/18/17  -  02/18/17  -    Bed Mobility PT LTG, Outcome goal ongoing  - goal ongoing  - goal ongoing  -    Transfer Training PT LTG    Transfer Training PT LTG, Date Established   02/18/17  -    Transfer Training PT LTG, Time to Achieve   5 days  -JM    Transfer Training PT LTG, Activity Type   bed to chair /chair to bed;sit to stand/stand to sit  -    Transfer Training PT LTG, [place] Level   supervision required  -    Transfer Training PT LTG, Assist Device   walker, rolling  -    Transfer Training PT  LTG, Date Goal Reviewed 02/19/17  -JM 02/18/17  -JM 02/18/17  -    Transfer Training PT LTG, Outcome goal ongoing  - goal ongoing  - goal ongoing  -    Gait Training PT LTG    Gait Training Goal PT LTG, Date Established   02/18/17  -    Gait Training Goal PT LTG, Time to Achieve   5 days  -JM    Gait Training Goal PT LTG, [place] Level   supervision required  -    Gait Training Goal PT LTG, Assist Device   walker, rolling  -    Gait Training Goal PT LTG, Distance to Achieve   250  -JM    Gait Training Goal PT LTG, Date Goal Reviewed 02/19/17  -JM 02/18/17  -JM 02/18/17  -    Gait Training Goal PT LTG, Outcome goal ongoing  - goal ongoing  - goal ongoing  -    Range of Motion PT LTG    Range of Motion Goal PT LTG, Date Established   02/18/17  -    Range of Motion Goal PT LTG, Time to Achieve   5 days  -JM    Range fo Motion Goal PT LTG, Joint   L knee  -    Range of Motion Goal PT LTG, AROM Measure   0-110  -JM    Range of Motion Goal PT LTG, Date Goal Reviewed 02/19/17  -JM 02/18/17  -JM 02/18/17  -    Range of Motion Goal PT LTG, Outcome goal ongoing  - goal ongoing  - goal ongoing  -    Patient Education PT LTG    Patient Education PT LTG, Date Established   02/18/17  -    Patient Education PT LTG, Time to Achieve   5 days  -JM    Patient Education PT LTG, Education Type   HEP;precaution per surgeon;gait;transfers;bed mobility;benefits of activity;pain management   -    Patient Education PT LTG, Education Understanding   demonstrate adequately;verbalize understanding  -    Patient Education PT LTG, Date Goal Reviewed 02/19/17  -ELSA 02/18/17  -ELSA 02/18/17  -    Patient Education PT LTG Outcome goal ongoing  - goal ongoing  - goal ongoing  -      User Key  (r) = Recorded By, (t) = Taken By, (c) = Cosigned By    Initials Name Provider Type    ELSA Jorge, PT Physical Therapist          Physical Therapy Education     Title: PT OT SLP Therapies (Active)     Topic: Physical Therapy (Done)     Point: Mobility training (Done)    Learning Progress Summary    Learner Readiness Method Response Comment Documented by Status   Patient Acceptance E VU,NR   02/19/17 0901 Done    Acceptance E VU,NR   02/18/17 1532 Done    Acceptance E VU,NR   02/18/17 0915 Done               Point: Home exercise program (Done)    Learning Progress Summary    Learner Readiness Method Response Comment Documented by Status   Patient Acceptance E VU,NR   02/19/17 0901 Done    Acceptance E VU,NR   02/18/17 1532 Done    Acceptance E VU,NR   02/18/17 0915 Done               Point: Body mechanics (Done)    Learning Progress Summary    Learner Readiness Method Response Comment Documented by Status   Patient Acceptance E VU,NR   02/19/17 0901 Done    Acceptance E VU,NR   02/18/17 1532 Done    Acceptance E VU,NR   02/18/17 0915 Done               Point: Precautions (Done)    Learning Progress Summary    Learner Readiness Method Response Comment Documented by Status   Patient Acceptance E VU,NR   02/19/17 0901 Done    Acceptance E VU,NR   02/18/17 1532 Done    Acceptance E VU,NR   02/18/17 0915 Done                      User Key     Initials Effective Dates Name Provider Type [facility] 06/19/15 -  Pedro Jorge, PT Physical Therapist PT                    PT Recommendation and Plan  Anticipated Discharge Disposition: home with home health, home with assist  Planned Therapy  Interventions: balance training, bed mobility training, gait training, home exercise program, patient/family education, strengthening, transfer training  PT Frequency: 2 times/day, per priority policy  Plan of Care Review  Plan Of Care Reviewed With: patient  Progress: improving  Outcome Summary/Follow up Plan: Pt with improved command following, increased IND with transfers; gait limited by nausea today; left knee AROM 0-98          Outcome Measures       02/19/17 0813 02/18/17 1503 02/18/17 0812    How much help from another person do you currently need...    Turning from your back to your side while in flat bed without using bedrails? 3  -JM 3  -JM 3  -JM    Moving from lying on back to sitting on the side of a flat bed without bedrails? 3  -JM 3  -JM 3  -JM    Moving to and from a bed to a chair (including a wheelchair)? 3  -JM 3  -JM 3  -JM    Standing up from a chair using your arms (e.g., wheelchair, bedside chair)? 3  -JM 3  -JM 3  -JM    Climbing 3-5 steps with a railing? 2  -JM 2  -JM 2  -JM    To walk in hospital room? 3  -JM 3  -JM 3  -JM    AM-PAC 6 Clicks Score 17  -JM 17  -JM 17  -JM    Functional Assessment    Outcome Measure Options AM-PAC 6 Clicks Basic Mobility (PT)  - AM-PAC 6 Clicks Basic Mobility (PT)  - AM-PAC 6 Clicks Basic Mobility (PT)  -      02/18/17 0810          How much help from another is currently needed...    Putting on and taking off regular lower body clothing? 2  -AC      Bathing (including washing, rinsing, and drying) 2  -AC      Toileting (which includes using toilet bed pan or urinal) 3  -AC      Putting on and taking off regular upper body clothing 3  -AC      Taking care of personal grooming (such as brushing teeth) 3  -AC      Eating meals 4  -AC      Score 17  -AC      Functional Assessment    Outcome Measure Options AM-PAC 6 Clicks Daily Activity (OT)  -AC        User Key  (r) = Recorded By, (t) = Taken By, (c) = Cosigned By    Initials Name Provider Type    AC  Gely Adams, OT Occupational Therapist    ELSA Jorge, PT Physical Therapist           Time Calculation:         PT Charges       02/19/17 0905          Time Calculation    Start Time 0813  -ELSA      PT Received On 02/19/17  -ELSA      PT Goal Re-Cert Due Date 02/28/17  -ELSA      Time Calculation- PT    Total Timed Code Minutes- PT 38 minute(s)  -ELSA        User Key  (r) = Recorded By, (t) = Taken By, (c) = Cosigned By    Initials Name Provider Type    ELSA Jorge, PT Physical Therapist          Therapy Charges for Today     Code Description Service Date Service Provider Modifiers Qty    65878956451 HC PT EVAL MOD COMPLEXITY 3 2/18/2017 Pedro Jorge, PT GP 1    92588863519 HC PT THER PROC EA 15 MIN 2/18/2017 Pedro Jorge, PT GP 1    51842100075 HC PT THER PROC EA 15 MIN 2/18/2017 Pedro Jorge, PT GP 1    85216648548 HC GAIT TRAINING EA 15 MIN 2/18/2017 Pedro Jorge, PT GP 1    84054918414 HC PT THER SUPP EA 15 MIN 2/18/2017 Pedro Jorge, PT GP 2    16727418114 HC GAIT TRAINING EA 15 MIN 2/19/2017 Pedro Jorge, PT GP 1    95948470475 HC PT THER PROC EA 15 MIN 2/19/2017 Pedro Jorge, PT GP 2    43464718562 HC PT THER SUPP EA 15 MIN 2/19/2017 Pedro Jorge, PT GP 2          PT G-Codes  Outcome Measure Options: AM-PAC 6 Clicks Basic Mobility (PT)    Pedro Jorge, PT  2/19/2017

## 2017-02-19 NOTE — PROGRESS NOTES
"IM progress note      Radha Ruggiero  [id number]  7/3/1932     LOS: 2 days     Attending: Simon Colvin MD    Primary Care Provider: Anuel Amador MD      Chief Complaint/Reason for visit:  Left knee pain    Subjective   Feels good. Pain control is adequate. Denies f/c/n/v/sob/cp.    Objective     Vital Signs  Blood pressure 122/54, pulse 62, temperature 98.3 °F (36.8 °C), temperature source Tympanic, resp. rate 18, height 62\" (157.5 cm), weight 162 lb (73.5 kg), SpO2 98 %.  Temp (24hrs), Av °F (36.7 °C), Min:95.6 °F (35.3 °C), Max:98.9 °F (37.2 °C)      Intake/Output:    Intake/Output Summary (Last 24 hours) at 17 1157  Last data filed at 17 0500   Gross per 24 hour   Intake    300 ml   Output    900 ml   Net   -600 ml         Physical Therapy: Pt with improved command following, increased IND with transfers; gait limited by nausea today; left knee AROM 0-98    Physical Exam:     General Appearance:    Alert, cooperative, in no acute distress   Head:    Normocephalic, without obvious abnormality, atraumatic    Lungs:     Normal effort, symmetric chest rise, no crepitus, clear to      auscultation bilaterally              Heart:    Regular rhythm and normal rate, normal S1 and S2    Abdomen:     Normal bowel sounds, no masses, no organomegaly, soft        non-tender, non-distended, no guarding, no rebound                tenderness   Extremities:   No clubbing, cyanosis or edema.  No deformities.     CDI prineo Left knee.    Pulses:   Pulses palpable and equal bilaterally   Skin:   No bleeding, bruising or rash   Neurologic:   Moves all extremities with no obvious focal motor deficit. Flexion and dorsiflexion intact bilateral feet.       Results Review:     I reviewed the patient's new clinical results.     Results from last 7 days  Lab Units 17  0511 17  0521   WBC 10*3/mm3  --  10.53   HEMOGLOBIN g/dL 9.6* 10.7*   HEMATOCRIT % 30.1* 32.9*   PLATELETS 10*3/mm3  --  185 "       Results from last 7 days  Lab Units 02/18/17  0521   SODIUM mmol/L 136   POTASSIUM mmol/L 4.2   CHLORIDE mmol/L 103   TOTAL CO2 mmol/L 29.0   BUN mg/dL 11   CREATININE mg/dL 0.70   CALCIUM mg/dL 8.7   GLUCOSE mg/dL 175*     I reviewed the patient's new imaging including images and reports.    All medications reviewed.     amiodarone 100 mg Oral Daily   atorvastatin 40 mg Oral Daily   enoxaparin 40 mg Subcutaneous Daily   insulin lispro 2-7 Units Subcutaneous 4x Daily With Meals & Nightly   levothyroxine 112 mcg Oral Q AM   oxybutynin XL 5 mg Oral Daily   pantoprazole 40 mg Oral Q AM   polyethylene glycol 17 g Oral Daily   sennosides-docusate sodium 2 tablet Oral BID   sertraline 50 mg Oral Daily       dextrose 25 g Q15 Min PRN   dextrose 15 g Q15 Min PRN   glucagon (human recombinant) 1 mg Q15 Min PRN   HYDROcodone-acetaminophen 1 tablet Q4H PRN   HYDROmorphone 0.5 mg Q2H PRN   And     naloxone 0.1 mg Q5 Min PRN   promethazine 12.5 mg Q6H PRN   Or     promethazine 12.5 mg Q6H PRN   sodium chloride 500 mL TID PRN       Assessment/Plan   Principal Problem:    S/P revision of total replacement of left knee  Active Problems:    Loose total knee arthroplasty    A-fib    HTN (hypertension)    Prediabetes    Acute blood loss anemia, mild, asymptomatic      Plan  1. PT/OT- WBAT LLE.  2. Pain control-prns, and ACB.  3. IS-encouraged  4. DVT proph-Lovenox till f/u with  then switch to Eliquis if ok with him.   5. Bowel regimen  6. Monitor post-op labs  7. DC planning. To rehab facility early this week if ready.       Saadia Kennedy, APRN  02/19/17  11:57 AM

## 2017-02-19 NOTE — PLAN OF CARE
Problem: Patient Care Overview (Adult)  Goal: Plan of Care Review  Outcome: Ongoing (interventions implemented as appropriate)    02/19/17 1436   Coping/Psychosocial Response Interventions   Plan Of Care Reviewed With patient   Patient Care Overview   Progress improving   Outcome Evaluation   Outcome Summary/Follow up Plan Pt demonstrating increased gait distance (x60') and independence with bed mobility (supervision). Much improved safety awareness.         Problem: Inpatient Physical Therapy  Goal: Bed Mobility Goal LTG- PT  Outcome: Ongoing (interventions implemented as appropriate)    02/18/17 0917 02/19/17 1436   Bed Mobility PT LTG   Bed Mobility PT LTG, Date Established 02/18/17 --    Bed Mobility PT LTG, Time to Achieve 5 days --    Bed Mobility PT LTG, Activity Type supine to sit/sit to supine --    Bed Mobility PT LTG, [place] Level conditional independence --    Bed Mobility PT LTG, Date Goal Reviewed --  02/19/17   Bed Mobility PT LTG, Outcome --  goal ongoing       Goal: Transfer Training Goal 1 LTG- PT  Outcome: Ongoing (interventions implemented as appropriate)    02/18/17 0917 02/19/17 1436   Transfer Training PT LTG   Transfer Training PT LTG, Date Established 02/18/17 --    Transfer Training PT LTG, Time to Achieve 5 days --    Transfer Training PT LTG, Activity Type bed to chair /chair to bed;sit to stand/stand to sit --    Transfer Training PT LTG, [place] Level supervision required --    Transfer Training PT LTG, Assist Device walker, rolling --    Transfer Training PT LTG, Date Goal Reviewed --  02/19/17   Transfer Training PT LTG, Outcome --  goal ongoing       Goal: Gait Training Goal LTG- PT  Outcome: Ongoing (interventions implemented as appropriate)    02/18/17 0917 02/19/17 1436   Gait Training PT LTG   Gait Training Goal PT LTG, Date Established 02/18/17 --    Gait Training Goal PT LTG, Time to Achieve 5 days --    Gait Training Goal PT LTG, [place] Level supervision  required --    Gait Training Goal PT LTG, Assist Device walker, rolling --    Gait Training Goal PT LTG, Distance to Achieve 250 --    Gait Training Goal PT LTG, Date Goal Reviewed --  02/19/17   Gait Training Goal PT LTG, Outcome --  goal ongoing       Goal: Range of Motion Goal LTG- PT  Outcome: Ongoing (interventions implemented as appropriate)    02/18/17 0917 02/19/17 1436   Range of Motion PT LTG   Range of Motion Goal PT LTG, Date Established 02/18/17 --    Range of Motion Goal PT LTG, Time to Achieve 5 days --    Range fo Motion Goal PT LTG, Joint L knee --    Range of Motion Goal PT LTG, AROM Measure 0-110 --    Range of Motion Goal PT LTG, Date Goal Reviewed --  02/19/17   Range of Motion Goal PT LTG, Outcome --  goal ongoing       Goal: Patient Education Goal LTG- PT  Outcome: Ongoing (interventions implemented as appropriate)    02/18/17 0917 02/19/17 1436   Patient Education PT LTG   Patient Education PT LTG, Date Established 02/18/17 --    Patient Education PT LTG, Time to Achieve 5 days --    Patient Education PT LTG, Education Type HEP;precaution per surgeon;gait;transfers;bed mobility;benefits of activity;pain management --    Patient Education PT LTG, Education Understanding demonstrate adequately;verbalize understanding --    Patient Education PT LTG, Date Goal Reviewed --  02/19/17   Patient Education PT LTG Outcome --  goal ongoing

## 2017-02-20 PROBLEM — D62 ACUTE BLOOD LOSS ANEMIA: Status: ACTIVE | Noted: 2017-02-20

## 2017-02-20 LAB
CYTO UR: NORMAL
GLUCOSE BLDC GLUCOMTR-MCNC: 103 MG/DL (ref 70–130)
GLUCOSE BLDC GLUCOMTR-MCNC: 104 MG/DL (ref 70–130)
GLUCOSE BLDC GLUCOMTR-MCNC: 107 MG/DL (ref 70–130)
GLUCOSE BLDC GLUCOMTR-MCNC: 111 MG/DL (ref 70–130)
LAB AP CASE REPORT: NORMAL
LAB AP CLINICAL INFORMATION: NORMAL
Lab: NORMAL
Lab: NORMAL
PATH REPORT.FINAL DX SPEC: NORMAL
PATH REPORT.GROSS SPEC: NORMAL

## 2017-02-20 PROCEDURE — 97116 GAIT TRAINING THERAPY: CPT

## 2017-02-20 PROCEDURE — 25010000002 PROMETHAZINE PER 50 MG: Performed by: ORTHOPAEDIC SURGERY

## 2017-02-20 PROCEDURE — 82962 GLUCOSE BLOOD TEST: CPT

## 2017-02-20 PROCEDURE — 94799 UNLISTED PULMONARY SVC/PX: CPT

## 2017-02-20 PROCEDURE — 97530 THERAPEUTIC ACTIVITIES: CPT | Performed by: OCCUPATIONAL THERAPIST

## 2017-02-20 PROCEDURE — 97110 THERAPEUTIC EXERCISES: CPT

## 2017-02-20 PROCEDURE — 25010000002 ENOXAPARIN PER 10 MG: Performed by: ORTHOPAEDIC SURGERY

## 2017-02-20 PROCEDURE — 25010000002 ROPIVACAINE PER 1 MG: Performed by: NURSE ANESTHETIST, CERTIFIED REGISTERED

## 2017-02-20 RX ADMIN — ENOXAPARIN SODIUM 40 MG: 40 INJECTION SUBCUTANEOUS at 09:35

## 2017-02-20 RX ADMIN — ROPIVACAINE HYDROCHLORIDE 10 ML/HR: 2 INJECTION, SOLUTION EPIDURAL; INFILTRATION at 14:24

## 2017-02-20 RX ADMIN — DOCUSATE SODIUM AND SENNOSIDES 2 TABLET: 8.6; 5 TABLET, FILM COATED ORAL at 08:45

## 2017-02-20 RX ADMIN — AMIODARONE HYDROCHLORIDE 100 MG: 200 TABLET ORAL at 08:44

## 2017-02-20 RX ADMIN — HYDROCODONE BITARTRATE AND ACETAMINOPHEN 1 TABLET: 7.5; 325 TABLET ORAL at 05:43

## 2017-02-20 RX ADMIN — LEVOTHYROXINE SODIUM 112 MCG: 112 TABLET ORAL at 05:43

## 2017-02-20 RX ADMIN — ATORVASTATIN CALCIUM 40 MG: 40 TABLET, FILM COATED ORAL at 21:00

## 2017-02-20 RX ADMIN — SERTRALINE HYDROCHLORIDE 50 MG: 50 TABLET ORAL at 08:45

## 2017-02-20 RX ADMIN — POLYETHYLENE GLYCOL 3350 17 G: 17 POWDER, FOR SOLUTION ORAL at 08:45

## 2017-02-20 RX ADMIN — PANTOPRAZOLE SODIUM 40 MG: 40 TABLET, DELAYED RELEASE ORAL at 05:43

## 2017-02-20 RX ADMIN — OXYBUTYNIN CHLORIDE 5 MG: 5 TABLET, EXTENDED RELEASE ORAL at 08:45

## 2017-02-20 RX ADMIN — PROMETHAZINE HYDROCHLORIDE 12.5 MG: 25 INJECTION INTRAMUSCULAR; INTRAVENOUS at 08:44

## 2017-02-20 RX ADMIN — HYDROCODONE BITARTRATE AND ACETAMINOPHEN 1 TABLET: 7.5; 325 TABLET ORAL at 00:21

## 2017-02-20 NOTE — PLAN OF CARE
Problem: Patient Care Overview (Adult)  Goal: Plan of Care Review  Outcome: Ongoing (interventions implemented as appropriate)    02/20/17 1139   Coping/Psychosocial Response Interventions   Plan Of Care Reviewed With patient   Patient Care Overview   Progress progress toward functional goals as expected   Outcome Evaluation   Outcome Summary/Follow up Plan Pt. ambulated a greater distance (100 ft.) and showed increased independence with bed mobility and transfers. Required multiple VC , demonstration, and repetition throughout PT session due to hardness of hearing and confusion.          Problem: Inpatient Physical Therapy  Goal: Bed Mobility Goal LTG- PT  Outcome: Outcome(s) achieved Date Met:  02/20/17 02/18/17 0917 02/19/17 1436 02/20/17 1139   Bed Mobility PT LTG   Bed Mobility PT LTG, Date Established --  --  02/20/17   Bed Mobility PT LTG, Time to Achieve 5 days --  --    Bed Mobility PT LTG, Activity Type supine to sit/sit to supine --  --    Bed Mobility PT LTG, [place] Level conditional independence --  --    Bed Mobility PT LTG, Date Goal Reviewed --  02/19/17 --    Bed Mobility PT LTG, Outcome --  --  goal met       Goal: Transfer Training Goal 1 LTG- PT  Outcome: Ongoing (interventions implemented as appropriate)    02/18/17 0917 02/19/17 1436 02/20/17 1139   Transfer Training PT LTG   Transfer Training PT LTG, Date Established --  --  02/20/17   Transfer Training PT LTG, Time to Achieve 5 days --  --    Transfer Training PT LTG, Activity Type bed to chair /chair to bed;sit to stand/stand to sit --  --    Transfer Training PT LTG, [place] Level supervision required --  --    Transfer Training PT LTG, Assist Device walker, rolling --  --    Transfer Training PT LTG, Date Goal Reviewed --  02/19/17 --    Transfer Training PT LTG, Outcome --  --  goal ongoing       Goal: Gait Training Goal LTG- PT  Outcome: Ongoing (interventions implemented as appropriate)    02/18/17 0917 02/19/17 1436  02/20/17 1139   Gait Training PT LTG   Gait Training Goal PT LTG, Date Established --  --  02/20/17   Gait Training Goal PT LTG, Time to Achieve 5 days --  --    Gait Training Goal PT LTG, [place] Level supervision required --  --    Gait Training Goal PT LTG, Assist Device walker, rolling --  --    Gait Training Goal PT LTG, Distance to Achieve 250 --  --    Gait Training Goal PT LTG, Date Goal Reviewed --  02/19/17 --    Gait Training Goal PT LTG, Outcome --  --  goal ongoing       Goal: Range of Motion Goal LTG- PT  Outcome: Ongoing (interventions implemented as appropriate)    02/18/17 0917 02/19/17 1436 02/20/17 1139   Range of Motion PT LTG   Range of Motion Goal PT LTG, Date Established --  --  02/20/17   Range of Motion Goal PT LTG, Time to Achieve 5 days --  --    Range fo Motion Goal PT LTG, Joint L knee --  --    Range of Motion Goal PT LTG, AROM Measure 0-110 --  --    Range of Motion Goal PT LTG, Date Goal Reviewed --  02/19/17 --    Range of Motion Goal PT LTG, Outcome --  --  goal ongoing       Goal: Patient Education Goal LTG- PT  Outcome: Ongoing (interventions implemented as appropriate)    02/18/17 0917 02/19/17 1436 02/20/17 1139   Patient Education PT LTG   Patient Education PT LTG, Date Established --  --  02/20/17   Patient Education PT LTG, Time to Achieve 5 days --  --    Patient Education PT LTG, Education Type HEP;precaution per surgeon;gait;transfers;bed mobility;benefits of activity;pain management --  --    Patient Education PT LTG, Education Understanding demonstrate adequately;verbalize understanding --  --    Patient Education PT LTG, Date Goal Reviewed --  02/19/17 --    Patient Education PT LTG Outcome --  --  goal ongoing

## 2017-02-20 NOTE — CONSULTS
"Diabetes Education  Assessment/Teaching    Patient Name:  Radha Ruggiero  YOB: 1932  MRN: [id number]  Admit Date:  2/17/2017      Assessment Date:  2/20/2017       Most Recent Value    General Information      Referral From:  MD order    Height  5' 2\" (1.575 m)    Height Method  Stated    Weight  162 lb (73.5 kg)    Weight Method  Stated    Pregnancy Assessment     Diabetes History     What type of diabetes do you have?  Pre-diabetes    Length of Diabetes Diagnosis  Newly diagnosed <6 months    Current DM knowledge  fair    Education Preferences     What areas of diabetes would you like to learn about?  avoiding high blood sugar, avoiding low blood sugar, exercise information, testing my blood sugar at home    Nutrition Information     Assessment Topics     Healthy Eating - Assessment  Needs education    Being Active - Assessment  Needs education    Taking Medication - Assessment  N/A-unable to assess    Problem Solving - Assessment  Needs education    Reducing Risk - Assessment  Needs education    Healthy Coping - Assessment  Competent    Monitoring - Assessment  Competent [Pt states she has a working meter at home and uses it \"time to time\".]    DM Goals                Most Recent Value    DM Education Needs     Meter  Has own    Frequency of Testing  Weekly    Problem Solving  Hyperglycemia, Treatment    Reducing Risks  A1C testing    Healthy Eating  Other (comment) [Imortance of eating balanced meals]    Physical Activity Frequency  Discussed exercise importance    Healthy Coping  Appropriate    Discharge Plan  Facility, Follow-up with PCP [Pt is hopeful to be discharged to acute rehab facility  for physical rehab.]    Motivation  Moderate    Teaching Method  Explanation, Discussion, Handouts    Patient Response  Verbalized understanding            Other Comments: Patient educated on Pre-diabetes, diabetes and the disease process, types of DM, diagnosis/A1C, monitoring, signs and symptoms, " activity and exercise and how to prevent disease from progressing. Changing behavior and goal setting relative to diet, exercise and healthy lifestyle was emphasized. Education handouts provided, questions answered and pt. advised to call with any other questions or concerns. Patient has a blood glucose meter at home. Patient was encouraged to follow-up with PCP. Thank you for this consult.       Electronically signed by:  Daniela Brumfield RN  02/20/17 10:43 AM

## 2017-02-20 NOTE — PROGRESS NOTES
Continued Stay Note  [facility]     Patient Name: Radha Ruggiero  MRN: [id number]  Today's Date: 2/20/2017    Admit Date: 2/17/2017          Discharge Plan       02/20/17 0943    Case Management/Social Work Plan    Plan [facility]    Patient/Family In Agreement With Plan yes    Additional Comments Contacted [facility] and spoke with Jamie, ph [phone], in admissions.  Jamie will verify Ms. Ruggiero's benefits and is planning on sending their admissions nurse to [facility] today to meet Ms. Ruggiero.  If accepted by [facility], they will likely have a room for Ms. Ruggiero for tomorrow, Tuesday, 2/21/17.  Son in law can transport.  will continue to follow.              Discharge Codes     None            Saloni Stapleton

## 2017-02-20 NOTE — PLAN OF CARE
Problem: Perioperative Period (Adult)  Goal: Signs and Symptoms of Listed Potential Problems Will be Absent or Manageable (Perioperative Period)  Outcome: Ongoing (interventions implemented as appropriate)    02/20/17 0107   Perioperative Period   Problems Assessed (Perioperative Period) all   Problems Present (Perioperative Period) pain;other (see comments)  (edema)

## 2017-02-20 NOTE — PLAN OF CARE
Problem: Patient Care Overview (Adult)  Goal: Plan of Care Review  Outcome: Ongoing (interventions implemented as appropriate)    02/20/17 1731   Coping/Psychosocial Response Interventions   Plan Of Care Reviewed With patient   Patient Care Overview   Progress improving       Goal: Adult Individualization and Mutuality  Outcome: Ongoing (interventions implemented as appropriate)    Problem: Perioperative Period (Adult)  Goal: Signs and Symptoms of Listed Potential Problems Will be Absent or Manageable (Perioperative Period)  Outcome: Ongoing (interventions implemented as appropriate)    02/20/17 1731   Perioperative Period   Problems Assessed (Perioperative Period) all   Problems Present (Perioperative Period) situational response;pain         Problem: Knee Replacement, Total (Adult)  Goal: Signs and Symptoms of Listed Potential Problems Will be Absent or Manageable (Knee Replacement, Total)  Outcome: Ongoing (interventions implemented as appropriate)    02/20/17 1731   Knee Replacement, Total   Problems Assessed (Total Knee Replacement) all   Problems Present (Total Knee Replacement) situational response

## 2017-02-20 NOTE — PROGRESS NOTES
Acute Care - Physical Therapy Treatment Note  [facility]     Patient Name: Radha Ruggiero  : 7/3/1932  MRN: [id number]  Today's Date: 2017  Onset of Illness/Injury or Date of Surgery Date: 17  Date of Referral to PT: 17  Referring Physician: Vinicius    Admit Date: 2017    Visit Dx:    ICD-10-CM ICD-9-CM   1. Impaired functional mobility, balance, gait, and endurance Z74.09 V49.89   2. Loose total knee arthroplasty T84.038A 996.41    Z96.659      Patient Active Problem List   Diagnosis   • Loose total knee arthroplasty   • S/P revision of total replacement of left knee   • A-fib   • HTN (hypertension)   • Prediabetes   • Acute blood loss anemia, mild, asymptomatic               Adult Rehabilitation Note       17 1415 17 1133 17 1040    Rehab Assessment/Intervention    Discipline (P)  physical therapist  -LB occupational therapist  -ST physical therapist  -EH,LB,EH2    Document Type (P)  therapy note (daily note)  -LB therapy note (daily note)  -ST therapy note (daily note)  -EH,LB,EH2    Subjective Information (P)  no complaints;agree to therapy  -LB no complaints;agree to therapy  -ST agree to therapy;no complaints  -EH,LB,EH2    Patient Effort, Rehab Treatment (P)  good  -LB good  -ST good  -EH,LB,EH2    Precautions/Limitations (P)  fall precautions   nerve catheter; pt. prone to impulsiveness  -LB fall precautions   adductor canal cath; impulsivity; confusion; exit alarm  -ST fall precautions   nerve catheter; pt. can be impulsive at times  -EH,LB,EH2    Patient Response to Treatment  pt confused throughout session; RN aware  -ST     Recorded by [LB] Sowmya Felix, PT Student [ST] Leonora Tyson OTR [EH,LB,EH2] Dejah Chacko, PT (r) Sowmya Felix, PT Student (t) Dejah Chacko, PT (c)    Pain Assessment    Pain Assessment (P)  [place] FACES  -LB 0-10  -ST [place] FACES  -EH,LB,EH2    Pain Score (P)  3  -LB 3  -ST 2  -EH,LB,EH2    Post Pain Score  (P)  3  -LB 3  -ST 4  -EH,LB,EH2    Pain Type (P)  Surgical pain  -LB Surgical pain  -ST Surgical pain  -EH,LB,EH2    Pain Location (P)  Knee  -LB Knee  -ST Knee  -EH,LB,EH2    Pain Orientation (P)  Left  -LB Left  -ST Left  -EH,LB,EH2    Pain Intervention(s) (P)  Repositioned;Ambulation/increased activity  -LB Repositioned;Ambulation/increased activity  -ST Ambulation/increased activity;Repositioned  -EH,LB,EH2    Response to Interventions (P)  Tolerated  -LB  Tolerated  -EH,LB,EH2    Recorded by [LB] Sowmya Felix, PT Student [ST] Leonora Tyson OTR [EH,LB,EH2] Dejah Chacko, PT (r) Sowmya Felix, PT Student (t) Dejah Chacko, PT (c)    Cognitive Assessment/Intervention    Current Cognitive/Communication Assessment (P)  impaired  -LB functional  -ST impaired   Pt. was hard of hearing and showed some signs of confusion  -EH,LB,EH2    Orientation Status (P)  oriented to;person;situation  -LB oriented to;person  -ST oriented to;person   Pt. did not realize she was in [facility]  -EH,LB,EH2    Follows Commands/Answers Questions (P)  75% of the time;able to follow single-step instructions;needs cueing;needs repetition  -LB 50% of the time;needs cueing;needs increased time   extra cuing for safety  -ST 75% of the time;able to follow single-step instructions;needs cueing;needs repetition  -EH,LB,EH2    Personal Safety (P)  mild impairment;impulsive  -LB moderate impairment  -ST mild impairment  -EH,LB,EH2    Personal Safety Interventions (P)  gait belt;fall prevention program maintained;elopement precautions initiated;nonskid shoes/slippers when out of bed  -LB elopement precautions initiated;fall prevention program maintained;gait belt;nonskid shoes/slippers when out of bed  -ST gait belt;elopement precautions initiated;fall prevention program maintained;nonskid shoes/slippers when out of bed  -EH,LB,EH2    Additional Documentation (P)  --   Pt. speaks incoherently at times  -LB  --   Pt. thought she was going for  X-rays when she was having PT  -EH,LB,EH2    Recorded by [LB] Swomya Felix, PT Student [ST] Leonora Tyson, OTR [EH,LB,EH2] Dejah Chacko, PT (r) Sowmya Felix, PT Student (t) Dejah Chacko, PT (c)    ROM (Range of Motion)    General ROM   lower extremity range of motion deficits identified  -EH,LB,EH2    General ROM Detail   70 AROM L knee flexion, lacking 3 L knee extension  -EH,LB,EH2    Recorded by   [EH,LB,EH2] Dejah Chacko, PT (r) Sowmya Felix, PT Student (t) Dejah Chacko, PT (c)    Bed Mobility, Assessment/Treatment    Bed Mobility, Assistive Device  head of bed elevated  -ST head of bed elevated;bed rails  -EH,LB,EH2    Bed Mobility, Roll Left, [place]   conditional independence  -EH,LB,EH2    Bed Mob, Supine to Sit, [place]  conditional independence  -ST conditional independence  -EH,LB,EH2    Bed Mob, Sit to Supine, [place]  not tested  -ST conditional independence  -EH,LB,EH2    Bed Mobility, Safety Issues  decreased use of arms for pushing/pulling;decreased use of legs for bridging/pushing  -ST decreased use of legs for bridging/pushing  -EH,LB,EH2    Bed Mobility, Impairments  strength decreased  -ST strength decreased;pain  -EH,LB,EH2    Bed Mobility, Comment (P)  defered   Pt. UIC upon PT arrival  -LB increased time to complete task, trouble motor planning  -ST pt. performed all bed mobility w/o use of a leg   -EH,LB,EH2    Recorded by [LB] Sowmya Felix, PT Student [ST] Leonora Tyson, OTR [EH,LB,EH2] Dejah Chacko, PT (r) Sowmya Felix, PT Student (t) Dejah Chacko, PT (c)    Transfer Assessment/Treatment    Transfers, Bed-Chair [place] (P)  contact guard assist  -LB  contact guard assist  -EH,LB,EH2    Transfers, Bed-Chair-Bed, Assist Device (P)  rolling walker  -LB  rolling walker  -EH,LB,EH2    Transfers, Sit-Stand [place] (P)  contact guard assist;verbal cues required  -LB contact guard assist  -ST contact guard assist;verbal  cues required  -EH,LB,EH2    Transfers, Stand-Sit [place] (P)  contact guard assist;verbal cues required  -LB contact guard assist  -ST contact guard assist;verbal cues required  -EH,LB,EH2    Transfers, Sit-Stand-Sit, Assist Device (P)  rolling walker  -LB rolling walker  -ST rolling walker  -EH,LB,EH2    Toilet Transfer, [place] (P)  minimum assist (75% patient effort);verbal cues required   Pt. had a bout of diarrhea at beginning of PT session  -LB minimum assist (75% patient effort)  -ST minimum assist (75% patient effort);verbal cues required  -EH,LB,EH2    Toilet Transfer, Assistive Device (P)  rolling walker   Pt. doesn't reach back or lower slowly despite education   -LB rolling walker  -ST rolling walker  -EH,LB,EH2    Transfer, Safety Issues (P)  impulsivity;balance decreased during turns;sequencing ability decreased  -LB  impulsivity;balance decreased during turns;sequencing ability decreased  -EH,LB,EH2    Transfer, Impairments (P)  strength decreased;pain  -LB  strength decreased;pain  -EH,LB,EH2    Transfer, Comment (P)  Repetition of multiple VCs required for hand placement, posture, and safety measures   Pt. demonstrated impulsiveness when performing sit-stand  -LB vc'ing for hand placement and transfer technique to improve overall safety  -ST VC required for hand placement and safety precautions   Pt. demonstrated impulsivity at times  -EH,LB,EH2    Recorded by [LB] Sowmya Felix, PT Student [ST] Leonora Tyson, OTR [EH,LB,EH2] Dejah Chacko, PT (r) Sowmya Felix, PT Student (t) Dejah Chacko, PT (c)    Gait Assessment/Treatment    Gait, [place] Level (P)  contact guard assist;verbal cues required  -LB  contact guard assist;verbal cues required  -EH,LB,EH2    Gait, Assistive Device (P)  rolling walker  -LB  rolling walker  -EH,LB,EH2    Gait, Distance (Feet) (P)  140  -LB  100  -EH,LB,EH2    Gait, Gait Pattern Analysis (P)  swing-to gait   Pt. alternated b/t swing-to and  swing-through  -LB  swing-to gait   gradually progressed to swing-through (RLE lagged behind)  -EH,LB,EH2    Gait, Gait Deviations (P)  left:;antalgic;lola decreased;decreased heel strike;right:;step length decreased;bilateral:;double stance time increased;forward flexed posture;limb motion velocity decreased  -LB  left:;antalgic;lola decreased;decreased heel strike;right:;step length decreased;bilateral:;double stance time increased;forward flexed posture;limb motion velocity decreased  -EH,LB,EH2    Gait, Safety Issues (P)  balance decreased during turns;step length decreased;sequencing ability decreased;weight-shifting ability decreased  -LB  balance decreased during turns;sequencing ability decreased;step length decreased;weight-shifting ability decreased  -EH,LB,EH2    Gait, Impairments (P)  strength decreased;pain;impaired balance  -LB  strength decreased;pain;impaired balance  -EH,LB,EH2    Gait, Comment (P)  VC required for sequencing, taking longer step lengths on RLE, posture, guiding the walker, pushing more weight through UEs  -LB  VC required for sequencing, posture, guidance of the walker (pt. had tendency to hug the wall  -EH,LB,EH2    Recorded by [LB] Sowmya Felix, PT Student  [EH,LB,EH2] Dejah Chacko, PT (r) Sowmya Felix, PT Student (t) Dejah Chacko, PT (c)    Functional Mobility    Functional Mobility- Ind. Level  contact guard assist  -ST     Functional Mobility- Device  rolling walker  -ST     Functional Mobility-Distance (Feet)  15  -ST     Functional Mobility- Safety Issues  balance decreased during turns;sequencing ability decreased;step length decreased;weight-shifting ability decreased  -ST     Functional Mobility- Comment  pt [facility] go of [facility]er [facility] in safe position, requiring max cuing for technique  -ST     Recorded by  [ST] GWEN Valentino     Toileting Assessment/Training    Toileting Assess/Train, Assistive Device  bedside  "commode  -ST     Toileting Assess/Train, Position  sitting  -ST     Toileting Assess/Train, Indepen Level  contact guard assist   for balance  -ST     Recorded by  [ST] Leonora Tyson, OTR     Therapy Exercises    Exercise Protocols (P)  total knee  -LB  total knee  -EH,LB,EH2    Total Knee Exercises (P)  left:;10 reps;completed protocol   Multiple VCs required; doesn't hold quad set; demo hip abd  -LB  left:;10 reps;completed protocol   Continuous VC, demonstration, and repetition required  -EH,LB,EH2    Recorded by [LB] Sowmya Felix, PT Student  [EH,LB,EH2] Dejah Chacko, PT (r) Sowmya Felix, PT Student (t) Dejah Chacko, PT (c)    Positioning and Restraints    Pre-Treatment Position (P)  sitting in chair/recliner  -LB in bed  -ST in bed  -EH,LB,EH2    Post Treatment Position (P)  chair  -LB chair  -ST bed  -EH,LB,EH2    In Bed   notified nsg;supine;call light within reach;encouraged to call for assist;exit alarm on  -EH,LB,EH2    In Chair (P)  notified nsg;reclined;call light within reach;encouraged to call for assist;exit alarm on  -LB notified nsg;reclined;call light within reach;encouraged to call for assist;exit alarm on  -ST     Recorded by [LB] Sowmya Felix, PT Student [ST] Leonora Tyson, OTR [EH,LB,EH2] Dejah Chacko, PT (r) Sowmya Felix, PT Student (t) Dejah Chacko, PT (c)      02/19/17 1410 02/19/17 0813 02/18/17 1503    Rehab Assessment/Intervention    Discipline physical therapist  -ELSA physical therapist  -ELSA physical therapist  -ELSA    Document Type therapy note (daily note)  -ELSA therapy note (daily note)  -ELSA therapy note (daily note)  -ELSA    Subjective Information agree to therapy;no complaints   \"I feel better\"  -ELSA agree to therapy;complains of;pain;nausea/vomiting  -ELSA agree to therapy;no complaints  -ELSA    Patient Effort, Rehab Treatment good  -JM good  -JM good  -JM    Precautions/Limitations fall precautions   KI when up, nerve cath  - fall precautions   KI when " up, nerve block catheter, impulsive  -JM fall precautions   Nerve block catheter; KI when up; impulsive  -JM    Recorded by [JM] Pedro Jorge, PT [JM] Pedro Jorge, PT [JM] Pedro Jorge, PT    Pain Assessment    Pain Assessment 0-10  -JM 0-10  -JM 0-10  -JM    Pain Score 4  -JM 4  -JM 2  -JM    Post Pain Score 4  -JM 5  -JM 2  -JM    Pain Type Surgical pain  -JM Surgical pain  -JM Surgical pain  -JM    Pain Location Knee  -JM Knee  -JM Knee  -JM    Pain Orientation Left  -JM Left  -JM Left  -JM    Pain Intervention(s) Medication (See MAR)  -JM Medication (See MAR);Repositioned  -JM     Response to Interventions Tolerated  -JM Tolerated  -JM     Recorded by [JM] Pedro Jorge, PT [JM] Pedro Jorge, PT [] Pedro Jorge, PT    Cognitive Assessment/Intervention    Current Cognitive/Communication Assessment functional  -JM impaired  -JM impaired  -JM    Orientation Status oriented x 4  -JM oriented x 4  -JM oriented x 4  -JM    Follows Commands/Answers Questions 100% of the time  -JM 75% of the time  -JM 50% of the time  -JM    Personal Safety WNL/WFL  -JM mild impairment  -JM moderate impairment;decreased awareness, need for assist;decreased awareness, need for safety;decreased insight to deficits  -    Personal Safety Interventions fall prevention program maintained;gait belt;muscle strengthening facilitated;nonskid shoes/slippers when out of bed  - fall prevention program maintained;gait belt;muscle strengthening facilitated;nonskid shoes/slippers when out of bed  -JM fall prevention program maintained;gait belt;muscle strengthening facilitated;nonskid shoes/slippers when out of bed  -JM    Recorded by [JM] Pedro Jorge, PT [JM] Pedro Jorge, PT [JM] Pedro Jorge, PT    ROM (Range of Motion)    General ROM Detail  -1 PROM knee extension; 0 AROM knee extension; 98 AROM knee flexion  -     Recorded by  [ELSA] Pedro Jorge, PT     Bed Mobility, Assessment/Treatment    Bed Mobility, Assistive Device head of  bed elevated  -JM head of bed elevated  -JM head of bed elevated  -JM    Bed Mob, Supine to Sit, [place] supervision required  - minimum assist (75% patient effort)  - contact guard assist  -    Bed Mobility, Impairments  strength decreased   Pt more weak getting up for first time this AM  -     Recorded by [JM] Pedro Jorge, PT [JM] Pedro Jorge, PT [JM] Pedro Jorge, PT    Transfer Assessment/Treatment    Transfers, Bed-Chair [place]  contact guard assist  -     Transfers, Bed-Chair-Bed, Assist Device  rolling walker  -JM     Transfers, Sit-Stand [place] contact guard assist   Improved safety awareness  - contact guard assist;verbal cues required  - minimum assist (75% patient effort);verbal cues required  -JM    Transfers, Stand-Sit [place] contact guard assist;verbal cues required   VC to slide LLE forward when sitting  - contact guard assist;verbal cues required  -     Transfers, Sit-Stand-Sit, Assist Device rolling walker  -JM rolling walker  -JM rolling walker  -    Toilet Transfer, [place]  minimum assist (75% patient effort)  -     Toilet Transfer, Assistive Device  rolling walker  -     Transfer, Comment   --   Impulsive, VCs for hand placement, and safe technique  -    Recorded by [JM] Pdero Jorge, PT [JM] Pedro Jorge, PT [JM] Pedro Jorge, PT    Gait Assessment/Treatment    Gait, [place] Level contact guard assist  - contact guard assist  - contact guard assist;verbal cues required  -    Gait, Assistive Device rolling walker  -JM rolling walker  -JM rolling walker  -JM    Gait, Distance (Feet) 60  -JM 30  -JM 45  -JM    Gait, Gait Pattern Analysis swing-to gait  -JM swing-to gait  -JM swing-to gait  -JM    Gait, Gait Deviations antalgic;decreased heel strike;forward flexed posture;limb motion velocity decreased;step length decreased  - antalgic;lola decreased;decreased heel strike  - forward flexed posture;antalgic;limb motion  velocity decreased   Shuffling to advance feet  -    Gait, Comment --   Gait distance limited by bowel urgency  - Gait distance limited by nausea today  -     Recorded by [ELSA] Pedro Jorge, PT [ELSA] Pedro Jorge, PT [ELSA] Pedro Jorge, PT    Therapy Exercises    Exercise Protocols  total knee  - total knee  -    Total Knee Exercises  left:;15 reps;completed protocol;with assist;ankle pumps/circles;quad set;heel slides;SLR;SAQ  -JM left:;10 reps;completed protocol;with assist;ankle pumps/circles;quad set;glut set;heel slides;SLR;SAQ;hip abduction/adduction  -    Recorded by  [ELSA] Pedro Jorge, PT [ELSA] Pedro Jorge, PT    Positioning and Restraints    Pre-Treatment Position in bed  -JM in bed  -JM in bed  -    Post Treatment Position bsc  - chair  - chair  -JM    In Chair  notified nsg;reclined;call light within reach;encouraged to call for assist;exit alarm on;legs elevated   nurse removed JAYSHREE hose  - notified nsg;reclined;call light within reach;encouraged to call for assist;exit alarm on;LLE elevated  -JM    On BS commode notified nsg;sitting;call light within reach;encouraged to call for assist;L knee immobilizer   PCT notified; call bell in hand  -      Recorded by [ELSA] Pedro Jorge, PT [ELSA] Pedro Jorge, PT [JM] Pedro Jorge, PT      User Key  (r) = Recorded By, (t) = Taken By, (c) = Cosigned By    Initials Name Effective Dates     Dejah Chacko, PT 06/19/15 -     ST Leonora Tyson, OTR 02/20/17 -     ELSA Jorge, PT 06/19/15 -     MALIK Felix, PT Student 03/23/16 -                 IP PT Goals       02/20/17 1531 02/20/17 1139 02/19/17 1436    Bed Mobility PT LTG    Bed Mobility PT LTG, Date Established  02/20/17  -EH (r) LB (t) EH (c)     Bed Mobility PT LTG, Date Goal Reviewed   02/19/17  -    Bed Mobility PT LTG, Outcome  goal met  -EH (r) LB (t) EH (c) goal ongoing  -JM    Transfer Training PT LTG    Transfer Training PT LTG, Date Established (P)  02/20/17   -LB 02/20/17  -EH (r) LB (t) EH (c)     Transfer Training PT  LTG, Date Goal Reviewed   02/19/17  -    Transfer Training PT LTG, Outcome (P)  goal ongoing  -LB goal ongoing  -EH (r) LB (t) EH (c) goal ongoing  -    Gait Training PT LTG    Gait Training Goal PT LTG, Date Established (P)  02/20/17  -LB 02/20/17  -EH (r) LB (t) EH (c)     Gait Training Goal PT LTG, Date Goal Reviewed   02/19/17  -    Gait Training Goal PT LTG, Outcome (P)  goal ongoing  -LB goal ongoing  -EH (r) LB (t) EH (c) goal ongoing  -    Range of Motion PT LTG    Range of Motion Goal PT LTG, Date Established (P)  02/20/17  -LB 02/20/17  -EH (r) LB (t) EH (c)     Range of Motion Goal PT LTG, Date Goal Reviewed   02/19/17  -    Range of Motion Goal PT LTG, Outcome (P)  goal ongoing  -LB goal ongoing  -EH (r) LB (t) EH (c) goal ongoing  -    Patient Education PT LTG    Patient Education PT LTG, Date Established (P)  02/20/17  -LB 02/20/17  -EH (r) LB (t) EH (c)     Patient Education PT LTG, Date Goal Reviewed   02/19/17  -    Patient Education PT LTG Outcome (P)  goal ongoing  -LB goal ongoing  -EH (r) LB (t) EH (c) goal ongoing  -      02/19/17 0902 02/18/17 1532 02/18/17 0917    Bed Mobility PT LTG    Bed Mobility PT LTG, Date Established   02/18/17  -    Bed Mobility PT LTG, Time to Achieve   5 days  -    Bed Mobility PT LTG, Activity Type   supine to sit/sit to supine  -    Bed Mobility PT LTG, [place] Level   conditional independence  -    Bed Mobility PT LTG, Date Goal Reviewed 02/19/17  -JM 02/18/17  - 02/18/17  -    Bed Mobility PT LTG, Outcome goal ongoing  - goal ongoing  - goal ongoing  -    Transfer Training PT LTG    Transfer Training PT LTG, Date Established   02/18/17  -    Transfer Training PT LTG, Time to Achieve   5 days  -    Transfer Training PT LTG, Activity Type   bed to chair /chair to bed;sit to stand/stand to sit  -    Transfer Training PT LTG, [place] Level   supervision  required  -    Transfer Training PT LTG, Assist Device   walker, rolling  -    Transfer Training PT  LTG, Date Goal Reviewed 02/19/17  -JM 02/18/17  - 02/18/17  -    Transfer Training PT LTG, Outcome goal ongoing  - goal ongoing  - goal ongoing  -    Gait Training PT LTG    Gait Training Goal PT LTG, Date Established   02/18/17  -    Gait Training Goal PT LTG, Time to Achieve   5 days  -JM    Gait Training Goal PT LTG, [place] Level   supervision required  -    Gait Training Goal PT LTG, Assist Device   walker, rolling  -    Gait Training Goal PT LTG, Distance to Achieve   250  -JM    Gait Training Goal PT LTG, Date Goal Reviewed 02/19/17  -JM 02/18/17  -JM 02/18/17  -    Gait Training Goal PT LTG, Outcome goal ongoing  - goal ongoing  - goal ongoing  -    Range of Motion PT LTG    Range of Motion Goal PT LTG, Date Established   02/18/17  -    Range of Motion Goal PT LTG, Time to Achieve   5 days  -    Range fo Motion Goal PT LTG, Joint   L knee  -    Range of Motion Goal PT LTG, AROM Measure   0-110  -JM    Range of Motion Goal PT LTG, Date Goal Reviewed 02/19/17  -JM 02/18/17  - 02/18/17  -    Range of Motion Goal PT LTG, Outcome goal ongoing  - goal ongoing  - goal ongoing  -    Patient Education PT LTG    Patient Education PT LTG, Date Established   02/18/17  -    Patient Education PT LTG, Time to Achieve   5 days  -    Patient Education PT LTG, Education Type   HEP;precaution per surgeon;gait;transfers;bed mobility;benefits of activity;pain management  -    Patient Education PT LTG, Education Understanding   demonstrate adequately;verbalize understanding  -    Patient Education PT LTG, Date Goal Reviewed 02/19/17  -JM 02/18/17  - 02/18/17  -    Patient Education PT LTG Outcome goal ongoing  - goal ongoing  - goal ongoing  -      User Key  (r) = Recorded By, (t) = Taken By, (c) = Cosigned By    Initials Name Provider Type    ANNALEE ESCOBEDO  Ricco, PT Physical Therapist    ELSA Jorge, PT Physical Therapist    MALIK Felix, PT Student PT Student          Physical Therapy Education     Title: PT OT SLP Therapies (Active)     Topic: Physical Therapy (Done)     Point: Mobility training (Done)    Learning Progress Summary    Learner Readiness Method Response Comment Documented by Status   Patient Acceptance E,D DU,NR Continuous VC, demonstration, and repetition necessary.  02/20/17 1535 Done    Acceptance E,D DU,NR Pt. required multiple cues and repetition during performance of exercises.  02/20/17 1137 Done    Acceptance E VU,NR   02/19/17 1436 Done    Acceptance E VU,NR   02/19/17 0901 Done    Acceptance E VU,NR   02/18/17 1532 Done    Acceptance E VU,NR   02/18/17 0915 Done               Point: Home exercise program (Done)    Learning Progress Summary    Learner Readiness Method Response Comment Documented by Status   Patient Acceptance E,D DU,NR Continuous VC, demonstration, and repetition necessary.  02/20/17 1535 Done    Acceptance E,D DU,NR Pt. required multiple cues and repetition during performance of exercises.  02/20/17 1137 Done    Acceptance E VU,NR   02/19/17 1436 Done    Acceptance E VU,NR   02/19/17 0901 Done    Acceptance E VU,NR   02/18/17 1532 Done    Acceptance E VU,NR   02/18/17 0915 Done               Point: Body mechanics (Done)    Learning Progress Summary    Learner Readiness Method Response Comment Documented by Status   Patient Acceptance E,D DU,NR Continuous VC, demonstration, and repetition necessary.  02/20/17 1535 Done    Acceptance E,D DU,NR Pt. required multiple cues and repetition during performance of exercises.  02/20/17 1137 Done    Acceptance E VU,NR   02/19/17 1436 Done    Acceptance E VU,NR   02/19/17 0901 Done    Acceptance E VU,NR   02/18/17 1532 Done    Acceptance E VU,NR   02/18/17 0915 Done               Point: Precautions (Done)    Learning Progress Summary     Learner Readiness Method Response Comment Documented by Status   Patient Acceptance E,D KANNAN,NR Continuous VC, demonstration, and repetition necessary.  02/20/17 1535 Done    Acceptance E,D KANNAN,NR Pt. required multiple cues and repetition during performance of exercises.  02/20/17 1137 Done    Acceptance E VU,NR   02/19/17 1436 Done    Acceptance E VU,NR   02/19/17 0901 Done    Acceptance E VU,NR   02/18/17 1532 Done    Acceptance E VU,NR   02/18/17 0915 Done                      User Key     Initials Effective Dates Name Provider Type Discipline     06/19/15 -  Pedro Jorge, PT Physical Therapist PT     03/23/16 -  Sowmya Felix, PT Student PT Student PT                    PT Recommendation and Plan  Anticipated Discharge Disposition: home with home health, home with assist  Planned Therapy Interventions: balance training, bed mobility training, gait training, home exercise program, patient/family education, strengthening, transfer training  PT Frequency: 2 times/day, per priority policy  Plan of Care Review  Plan Of Care Reviewed With: (P) patient  Progress: (P) progress toward functional goals as expected  Outcome Summary/Follow up Plan: (P) Pt. increased ambulation distance to 140'. Continuous VCs and repetition still required throughout PT session regarding correct performance of exercises and safety precautions during t/f and gait.           Outcome Measures       02/20/17 1415 02/20/17 1133 02/20/17 1040    How much help from another person do you currently need...    Turning from your back to your side while in flat bed without using bedrails? (P)  4  -LB  4  -EH (r) LB (t) EH (c)    Moving from lying on back to sitting on the side of a flat bed without bedrails? (P)  4  -LB  4  -EH (r) LB (t) EH (c)    Moving to and from a bed to a chair (including a wheelchair)? (P)  3  -LB  3  -EH (r) LB (t) EH (c)    Standing up from a chair using your arms (e.g., wheelchair, bedside chair)? (P)  3  -LB  3   -EH (r) LB (t) EH (c)    Climbing 3-5 steps with a railing? (P)  2  -LB  2  -EH (r) LB (t) EH (c)    To walk in hospital room? (P)  3  -LB  3  -EH (r) LB (t) EH (c)    AM-PAC 6 Clicks Score (P)  19  -LB  19  -EH (r) LB (t)    How much help from another is currently needed...    Putting on and taking off regular lower body clothing?  2  -ST     Bathing (including washing, rinsing, and drying)  2  -ST     Toileting (which includes using toilet bed pan or urinal)  3  -ST     Putting on and taking off regular upper body clothing  3  -ST     Taking care of personal grooming (such as brushing teeth)  3  -ST     Eating meals  4  -ST     Score  17  -ST     Functional Assessment    Outcome Measure Options (P)  AM-PAC 6 Clicks Basic Mobility (PT)  -LB  AM-PAC 6 Clicks Basic Mobility (PT)  -EH (r) LB (t) EH (c)      02/19/17 1410 02/19/17 0813 02/18/17 1503    How much help from another person do you currently need...    Turning from your back to your side while in flat bed without using bedrails? 3  -JM 3  -JM 3  -JM    Moving from lying on back to sitting on the side of a flat bed without bedrails? 3  -JM 3  -JM 3  -JM    Moving to and from a bed to a chair (including a wheelchair)? 3  -JM 3  -JM 3  -JM    Standing up from a chair using your arms (e.g., wheelchair, bedside chair)? 3  -JM 3  -JM 3  -JM    Climbing 3-5 steps with a railing? 2  -JM 2  -JM 2  -JM    To walk in hospital room? 3  -JM 3  -JM 3  -JM    AM-PAC 6 Clicks Score 17  -JM 17  -JM 17  -JM    Functional Assessment    Outcome Measure Options AM-PAC 6 Clicks Basic Mobility (PT)  -JM AM-PAC 6 Clicks Basic Mobility (PT)  -JM AM-PAC 6 Clicks Basic Mobility (PT)  -JM      02/18/17 0812 02/18/17 0810       How much help from another person do you currently need...    Turning from your back to your side while in flat bed without using bedrails? 3  -JM      Moving from lying on back to sitting on the side of a flat bed without bedrails? 3  -JM      Moving to and from  a bed to a chair (including a wheelchair)? 3  -JM      Standing up from a chair using your arms (e.g., wheelchair, bedside chair)? 3  -JM      Climbing 3-5 steps with a railing? 2  -JM      To walk in hospital room? 3  -JM      AM-PAC 6 Clicks Score 17  -JM      How much help from another is currently needed...    Putting on and taking off regular lower body clothing?  2  -AC     Bathing (including washing, rinsing, and drying)  2  -AC     Toileting (which includes using toilet bed pan or urinal)  3  -AC     Putting on and taking off regular upper body clothing  3  -AC     Taking care of personal grooming (such as brushing teeth)  3  -AC     Eating meals  4  -AC     Score  17  -AC     Functional Assessment    Outcome Measure Options AM-PAC 6 Clicks Basic Mobility (PT)  -JM AM-PAC 6 Clicks Daily Activity (OT)  -AC       User Key  (r) = Recorded By, (t) = Taken By, (c) = Cosigned By    Initials Name Provider Type    AC Gely Adams, OT Occupational Therapist     Dejah Chacko, PT Physical Therapist    ST Leonora Tyson, OTR Occupational Therapist    ELSA Jorge, PT Physical Therapist    MALIK Felix, PT Student PT Student           Time Calculation:         PT Charges       02/20/17 1536 02/20/17 1144       Time Calculation    Start Time (P)  1415  -LB 1040  -EH (r) LB (t) EH (c)     PT Received On (P)  02/20/17  -LB 02/20/17  -EH (r) LB (t) EH (c)     PT Goal Re-Cert Due Date (P)  03/02/17  -LB 03/02/17  -EH (r) LB (t) EH (c)     Time Calculation- PT    Total Timed Code Minutes- PT (P)  40 minute(s)  -LB 24 minute(s)  -EH (r) LB (t) EH (c)       User Key  (r) = Recorded By, (t) = Taken By, (c) = Cosigned By    Initials Name Provider Type     Dejah Chacko, PT Physical Therapist    MALIK Felix, PT Student PT Student          Therapy Charges for Today     Code Description Service Date Service Provider Modifiers Qty    49692113795 HC GAIT TRAINING EA 15 MIN 2/20/2017 Sowmya Felix, PT  Student GP 1    71496375929 HC PT THER PROC EA 15 MIN 2/20/2017 Sowmya Felix, PT Student GP 1    38314138966 HC GAIT TRAINING EA 15 MIN 2/20/2017 Sowmya Felix, PT Student GP 2    07432519377 HC PT THER PROC EA 15 MIN 2/20/2017 Sowmya Felix, PT Student GP 1          PT G-Codes  Outcome Measure Options: (P) AM-PAC 6 Clicks Basic Mobility (PT)    Sowmya Felix, PT Student  2/20/2017

## 2017-02-20 NOTE — PROGRESS NOTES
"IM progress note      Radha Ruggiero  [id number]  7/3/1932     LOS: 3 days     Attending: Simon Colvin MD    Primary Care Provider: Anuel Amador MD      Chief Complaint/Reason for visit:  Left knee pain    Subjective   Doing well. Pain control is good. Denies f/c/n/v/sob/cp.  Good progress. Good pain control. Ready for rehab transfer tomorrow.wy  Objective     Vital Signs  Blood pressure 157/83, pulse 64, temperature 97.9 °F (36.6 °C), temperature source Axillary, resp. rate 18, height 62\" (157.5 cm), weight 162 lb (73.5 kg), SpO2 97 %.  Temp (24hrs), Av.4 °F (36.9 °C), Min:97.8 °F (36.6 °C), Max:99 °F (37.2 °C)      Intake/Output:    Intake/Output Summary (Last 24 hours) at 17 1218  Last data filed at 17 0900   Gross per 24 hour   Intake    275 ml   Output   1750 ml   Net  -1475 ml        Physical Therapy: Pt. ambulated a greater distance (100 ft.) and showed increased independence with bed mobility and transfers. Required multiple VC , demonstration, and repetition throughout PT session due to hardness of hearing and confusion.      Physical Exam:     General Appearance:    Alert, cooperative, in no acute distress   Head:    Normocephalic, without obvious abnormality, atraumatic    Lungs:     Normal effort, symmetric chest rise, no crepitus, clear to      auscultation bilaterally              Heart:    Regular rhythm and normal rate, normal S1 and S2    Abdomen:     Normal bowel sounds, no masses, no organomegaly, soft        non-tender, non-distended, no guarding, no rebound                tenderness   Extremities:   No clubbing, cyanosis or edema.  No deformities.     CDI prineo Left knee. Nerve block present   Pulses:   Pulses palpable and equal bilaterally   Skin:   No bleeding, bruising or rash   Neurologic:   Moves all extremities with no obvious focal motor deficit. Flexion and dorsiflexion intact bilateral feet.       Results Review:     I reviewed the patient's new " clinical results.     Results from last 7 days  Lab Units 02/19/17  0511 02/18/17  0521   WBC 10*3/mm3  --  10.53   HEMOGLOBIN g/dL 9.6* 10.7*   HEMATOCRIT % 30.1* 32.9*   PLATELETS 10*3/mm3  --  185       Results from last 7 days  Lab Units 02/18/17  0521   SODIUM mmol/L 136   POTASSIUM mmol/L 4.2   CHLORIDE mmol/L 103   TOTAL CO2 mmol/L 29.0   BUN mg/dL 11   CREATININE mg/dL 0.70   CALCIUM mg/dL 8.7   GLUCOSE mg/dL 175*     I reviewed the patient's new imaging including images and reports.    All medications reviewed.     amiodarone 100 mg Oral Daily   atorvastatin 40 mg Oral Daily   enoxaparin 40 mg Subcutaneous Daily   insulin lispro 2-7 Units Subcutaneous 4x Daily With Meals & Nightly   levothyroxine 112 mcg Oral Q AM   oxybutynin XL 5 mg Oral Daily   pantoprazole 40 mg Oral Q AM   polyethylene glycol 17 g Oral Daily   sennosides-docusate sodium 2 tablet Oral BID   sertraline 50 mg Oral Daily       dextrose 25 g Q15 Min PRN   dextrose 15 g Q15 Min PRN   glucagon (human recombinant) 1 mg Q15 Min PRN   HYDROcodone-acetaminophen 1 tablet Q4H PRN   HYDROmorphone 0.5 mg Q2H PRN   And     naloxone 0.1 mg Q5 Min PRN   promethazine 12.5 mg Q6H PRN   Or     promethazine 12.5 mg Q6H PRN   sodium chloride 500 mL TID PRN       Assessment/Plan   Principal Problem:    S/P revision of total replacement of left knee  Active Problems:    Loose total knee arthroplasty    A-fib    HTN (hypertension)    Prediabetes    Acute blood loss anemia, mild, asymptomatic      Plan  1. PT/OT- WBAT LLE.  2. Pain control-prns, and ACB.  3. IS-encouraged  4. DVT proph-Lovenox till f/u with  then switch to Eliquis if ok with him.   5. Bowel regimen  6. Monitor post-op labs  7. DC planning to [place], likely tomorrow       Saadia Kennedy, APRN  02/20/17  12:18 PM   Seen and examined by me. Agree with above. Discussed with patient.

## 2017-02-20 NOTE — PLAN OF CARE
Problem: Patient Care Overview (Adult)  Goal: Plan of Care Review  Outcome: Ongoing (interventions implemented as appropriate)    02/20/17 1531   Coping/Psychosocial Response Interventions   Plan Of Care Reviewed With patient   Patient Care Overview   Progress progress toward functional goals as expected   Outcome Evaluation   Outcome Summary/Follow up Plan Pt. increased ambulation distance to 140'. Continuous VCs and repetition still required throughout PT session regarding correct performance of exercises and safety precautions during t/f and gait.          02/20/17 1531   Coping/Psychosocial Response Interventions   Plan Of Care Reviewed With patient   Patient Care Overview   Progress progress toward functional goals as expected   Outcome Evaluation   Outcome Summary/Follow up Plan Pt. increased ambulation distance to 140'. Continuous VCs and repetition still required throughout PT session regarding correct performance of exercises and safety precautions during t/f and gait.          Problem: Inpatient Physical Therapy  Goal: Transfer Training Goal 1 LTG- PT  Outcome: Ongoing (interventions implemented as appropriate)    02/18/17 0917 02/19/17 1436 02/20/17 1531   Transfer Training PT LTG   Transfer Training PT LTG, Date Established --  --  02/20/17   Transfer Training PT LTG, Time to Achieve 5 days --  --    Transfer Training PT LTG, Activity Type bed to chair /chair to bed;sit to stand/stand to sit --  --    Transfer Training PT LTG, [place] Level supervision required --  --    Transfer Training PT LTG, Assist Device walker, rolling --  --    Transfer Training PT LTG, Date Goal Reviewed --  02/19/17 --    Transfer Training PT LTG, Outcome --  --  goal ongoing       Goal: Gait Training Goal LTG- PT  Outcome: Ongoing (interventions implemented as appropriate)    02/18/17 0917 02/19/17 1436 02/20/17 1531   Gait Training PT LTG   Gait Training Goal PT LTG, Date Established --  --  02/20/17   Gait Training Goal  PT LTG, Time to Achieve 5 days --  --    Gait Training Goal PT LTG, [place] Level supervision required --  --    Gait Training Goal PT LTG, Assist Device walker, rolling --  --    Gait Training Goal PT LTG, Distance to Achieve 250 --  --    Gait Training Goal PT LTG, Date Goal Reviewed --  02/19/17 --    Gait Training Goal PT LTG, Outcome --  --  goal ongoing       Goal: Range of Motion Goal LTG- PT  Outcome: Ongoing (interventions implemented as appropriate)    02/18/17 0917 02/19/17 1436 02/20/17 1531   Range of Motion PT LTG   Range of Motion Goal PT LTG, Date Established --  --  02/20/17   Range of Motion Goal PT LTG, Time to Achieve 5 days --  --    Range fo Motion Goal PT LTG, Joint L knee --  --    Range of Motion Goal PT LTG, AROM Measure 0-110 --  --    Range of Motion Goal PT LTG, Date Goal Reviewed --  02/19/17 --    Range of Motion Goal PT LTG, Outcome --  --  goal ongoing       Goal: Patient Education Goal LTG- PT  Outcome: Ongoing (interventions implemented as appropriate)    02/18/17 0917 02/19/17 1436 02/20/17 1531   Patient Education PT LTG   Patient Education PT LTG, Date Established --  --  02/20/17   Patient Education PT LTG, Time to Achieve 5 days --  --    Patient Education PT LTG, Education Type HEP;precaution per surgeon;gait;transfers;bed mobility;benefits of activity;pain management --  --    Patient Education PT LTG, Education Understanding demonstrate adequately;verbalize understanding --  --    Patient Education PT LTG, Date Goal Reviewed --  02/19/17 --    Patient Education PT LTG Outcome --  --  goal ongoing

## 2017-02-20 NOTE — PROGRESS NOTES
Continued Stay Note  [facility]     Patient Name: Radha Ruggiero  MRN: [id number]  Today's Date: 2/20/2017    Admit Date: 2/17/2017          Discharge Plan       02/20/17 1434    Case Management/Social Work Plan    Plan [place]    Patient/Family In Agreement With Plan yes    Additional Comments Jamie from [facility] has evaluated Ms. Ruggiero and offered her a bed for inpatient rehab for tomorrow, Tuesday, 2/21/17, if medically ready.  Ms. Ruggiero's son in law will be transporting her to the facility.  CM will continue to follow.              Discharge Codes     None            Saloni Stapleton

## 2017-02-20 NOTE — PLAN OF CARE
Problem: Patient Care Overview (Adult)  Goal: Plan of Care Review  Outcome: Ongoing (interventions implemented as appropriate)    02/20/17 1133   Coping/Psychosocial Response Interventions   Plan Of Care Reviewed With patient   Outcome Evaluation   Outcome Summary/Follow up Plan pt with very poor safety awareness this date and requiring significant cuing and assist for transfers and rwx use         Problem: Inpatient Occupational Therapy  Goal: Bed Mobility Goal LTG- OT  Outcome: Ongoing (interventions implemented as appropriate)  Goal: Safety Awareness Goal STG- OT  Outcome: Ongoing (interventions implemented as appropriate)    02/18/17 0913 02/20/17 1133   Safety Awareness OT STG   Safety Awareness OT STG, Date Established 02/18/17 --    Safety Awareness OT STG, Time to Achieve by discharge --    Safety Awareness OT STG, Activity Type good safety awareness;with ADL's --    Safety Awareness OT STG, Outcome --  goal ongoing       Goal: LB Dressing Goal LTG- OT  Outcome: Ongoing (interventions implemented as appropriate)    02/18/17 0913 02/20/17 1133   LB Dressing OT LTG   LB Dressing Goal OT LTG, Date Established 02/18/17 --    LB Dressing Goal OT LTG, Time to Achieve by discharge --    LB Dressing Goal OT LTG, [place] Level moderate assist (50% patient effort) --    LB Dressing Goal OT LTG, Adaptive Equipment (AE as appropriate) --    LB Dressing Goal OT LTG, Outcome --  goal ongoing       Goal: Functional Mobility Goal LTG- OT  Outcome: Outcome(s) achieved Date Met:  02/20/17 02/18/17 0913 02/20/17 1133   Functional Mobility OT LTG   Functional Mobility Goal OT LTG, Date Established 02/18/17 --    Functional Mobility Goal OT LTG, Time to Achieve by discharge --    Functional Mobility Goal OT LTG, [place] Level contact guard --    Functional Mobility Goal OT LTG, Assist Device rolling walker --    Functional Mobility Goal OT LTG, Distance to Achieve to the bathroom;in hallway --    Functional Mobility  Goal OT LTG, Outcome --  goal met  (met assist level, however unsafe)

## 2017-02-20 NOTE — PLAN OF CARE
Problem: Patient Care Overview (Adult)  Goal: Plan of Care Review  Outcome: Ongoing (interventions implemented as appropriate)    02/20/17 0107   Coping/Psychosocial Response Interventions   Plan Of Care Reviewed With patient   Patient Care Overview   Progress progress toward functional goals as expected

## 2017-02-20 NOTE — PROGRESS NOTES
[facility]    Acute pain service Inpatient Progress Note    Patient Name: Radha Ruggiero  :  7/3/1932  MRN:  [id number]        Acute Pain  Service Inpatient Progress Note:    Analgesia:Excellent  Pain Score:2/10  LOC: asleep but arousable  Resp Status: room air  Cardiac: VS stable  Side Effects:None  Catheter Site:clean  Cath type: peripheral nerve cath(MOOG pump)  Infusion rate: 10ml/hr  Catheter Plan:Continue catheter infusion rate unchanged    Patient states she is happy at this time and does not voice any complaints. She shows me her knee and we rearrange her ice pack.

## 2017-02-20 NOTE — PROGRESS NOTES
"Acute Care - Physical Therapy Treatment Note  [facility]     Patient Name: Radha Ruggiero  : 7/3/1932  MRN: [id number]  Today's Date: 2017  Onset of Illness/Injury or Date of Surgery Date: 17  Date of Referral to PT: 17  Referring Physician: Vinicius    Admit Date: 2017    Visit Dx:    ICD-10-CM ICD-9-CM   1. Impaired functional mobility, balance, gait, and endurance Z74.09 V49.89   2. Loose total knee arthroplasty T84.038A 996.41    Z96.659      Patient Active Problem List   Diagnosis   • Loose total knee arthroplasty   • S/P revision of total replacement of left knee   • A-fib   • HTN (hypertension)   • Prediabetes               Adult Rehabilitation Note       17 1040 17 1410 17 0813    Rehab Assessment/Intervention    Discipline (P)  physical therapist  -LB physical therapist  -JM physical therapist  -JM    Document Type (P)  therapy note (daily note)  -LB therapy note (daily note)  - therapy note (daily note)  -    Subjective Information (P)  agree to therapy;no complaints  -LB agree to therapy;no complaints   \"I feel better\"  - agree to therapy;complains of;pain;nausea/vomiting  -    Patient Effort, Rehab Treatment (P)  good  -LB good  -JM good  -JM    Precautions/Limitations (P)  fall precautions   nerve catheter; pt. can be impulsive at times  -LB fall precautions   KI when up, nerve cath  -JM fall precautions   KI when up, nerve block catheter, impulsive  -JM    Recorded by [LB] Sowmya Felix, PT Student [JM] Pedro Jorge, PT [JM] Pedro Jorge, PT    Pain Assessment    Pain Assessment (P)  [place] FACES  -LB 0-10  -JM 0-10  -JM    Pain Score (P)  2  -LB 4  -JM 4  -JM    Post Pain Score (P)  4  -LB 4  -JM 5  -JM    Pain Type (P)  Surgical pain  -LB Surgical pain  -JM Surgical pain  -JM    Pain Location (P)  Knee  -LB Knee  -JM Knee  -JM    Pain Orientation (P)  Left  -LB Left  -JM Left  -JM    Pain Intervention(s) (P)  Ambulation/increased " activity;Repositioned  -LB Medication (See MAR)  -JM Medication (See MAR);Repositioned  -JM    Response to Interventions (P)  Tolerated  -LB Tolerated  -JM Tolerated  -JM    Recorded by [LB] Sowmya Felix, PT Student [JM] Pedro Jorge, PT [JM] Pedro Jorge, PT    Cognitive Assessment/Intervention    Current Cognitive/Communication Assessment (P)  impaired   Pt. was hard of hearing and showed some signs of confusion  -LB functional  -JM impaired  -JM    Orientation Status (P)  oriented to;person   Pt. did not realize she was in [facility]  -LB oriented x 4  -JM oriented x 4  -JM    Follows Commands/Answers Questions (P)  75% of the time;able to follow single-step instructions;needs cueing;needs repetition  -% of the time  -JM 75% of the time  -    Personal Safety (P)  mild impairment  -LB WNL/WFL  -JM mild impairment  -JM    Personal Safety Interventions (P)  gait belt;elopement precautions initiated;fall prevention program maintained;nonskid shoes/slippers when out of bed  -LB fall prevention program maintained;gait belt;muscle strengthening facilitated;nonskid shoes/slippers when out of bed  -JM fall prevention program maintained;gait belt;muscle strengthening facilitated;nonskid shoes/slippers when out of bed  -JM    Additional Documentation (P)  --   Pt. thought she was going for X-rays when she was having PT  -LB      Recorded by [LB] Sowmya Felix, PT Student [JM] Pedro Jorge, PT [JM] Pedro Jorge, PT    ROM (Range of Motion)    General ROM (P)  lower extremity range of motion deficits identified  -LB      General ROM Detail (P)  70 AROM L knee flexion, lacking 3 L knee extension  -LB  -1 PROM knee extension; 0 AROM knee extension; 98 AROM knee flexion  -JM    Recorded by [LB] Sowmya Felix, PT Student  [JM] Pedro Jorge, PT    Bed Mobility, Assessment/Treatment    Bed Mobility, Assistive Device (P)  head of bed elevated;bed rails  -LB head of bed elevated  -JM head of bed elevated  -JM    Bed Mobility,  Roll Left, [place] (P)  conditional independence  -LB      Bed Mob, Supine to Sit, [place] (P)  conditional independence  -LB supervision required  -JM minimum assist (75% patient effort)  -    Bed Mob, Sit to Supine, [place] (P)  conditional independence  -LB      Bed Mobility, Safety Issues (P)  decreased use of legs for bridging/pushing  -LB      Bed Mobility, Impairments (P)  strength decreased;pain  -LB  strength decreased   Pt more weak getting up for first time this AM  -JM    Bed Mobility, Comment (P)  pt. performed all bed mobility w/o use of a leg   -LB      Recorded by [LB] Sowmya Felix, PT Student [JM] Pedro Jorge, PT [JM] Pedro Jorge, PT    Transfer Assessment/Treatment    Transfers, Bed-Chair [place] (P)  contact guard assist  -LB  contact guard assist  -    Transfers, Bed-Chair-Bed, Assist Device (P)  rolling walker  -LB  rolling walker  -JM    Transfers, Sit-Stand [place] (P)  contact guard assist;verbal cues required  -LB contact guard assist   Improved safety awareness  -JM contact guard assist;verbal cues required  -JM    Transfers, Stand-Sit [place] (P)  contact guard assist;verbal cues required  -LB contact guard assist;verbal cues required   VC to slide LLE forward when sitting  -JM contact guard assist;verbal cues required  -    Transfers, Sit-Stand-Sit, Assist Device (P)  rolling walker  -LB rolling walker  -JM rolling walker  -JM    Toilet Transfer, [place] (P)  minimum assist (75% patient effort);verbal cues required  -LB  minimum assist (75% patient effort)  -    Toilet Transfer, Assistive Device (P)  rolling walker  -LB  rolling walker  -JM    Transfer, Safety Issues (P)  impulsivity;balance decreased during turns;sequencing ability decreased  -LB      Transfer, Impairments (P)  strength decreased;pain  -LB      Transfer, Comment (P)  VC required for hand placement and safety precautions   Pt. demonstrated impulsivity at times  -LB       Recorded by [LB] Sowmya Felix, PT Student [JM] Pedro Jorge, PT [JM] Pedro Jorge, PT    Gait Assessment/Treatment    Gait, [place] Level (P)  contact guard assist;verbal cues required  -LB contact guard assist  -JM contact guard assist  -JM    Gait, Assistive Device (P)  rolling walker  -LB rolling walker  -JM rolling walker  -JM    Gait, Distance (Feet) (P)  100  -LB 60  -JM 30  -JM    Gait, Gait Pattern Analysis (P)  swing-to gait   gradually progressed to swing-through (RLE lagged behind)  -LB swing-to gait  -JM swing-to gait  -JM    Gait, Gait Deviations (P)  left:;antalgic;lola decreased;decreased heel strike;right:;step length decreased;bilateral:;double stance time increased;forward flexed posture;limb motion velocity decreased  -LB antalgic;decreased heel strike;forward flexed posture;limb motion velocity decreased;step length decreased  -JM antalgic;lola decreased;decreased heel strike  -JM    Gait, Safety Issues (P)  balance decreased during turns;sequencing ability decreased;step length decreased;weight-shifting ability decreased  -LB      Gait, Impairments (P)  strength decreased;pain;impaired balance  -LB      Gait, Comment (P)  VC required for sequencing, posture, guidance of the walker (pt. had tendency to hug the wall  -LB --   Gait distance limited by bowel urgency  -JM Gait distance limited by nausea today  -JM    Recorded by [LB] Sowmya Felix, PT Student [JM] Pedro Jorge, PT [JM] Pedro Jorge, PT    Therapy Exercises    Exercise Protocols (P)  total knee  -LB  total knee  -JM    Total Knee Exercises (P)  left:;10 reps;completed protocol   Continuous VC, demonstration, and repetition required  -LB  left:;15 reps;completed protocol;with assist;ankle pumps/circles;quad set;heel slides;SLR;SAQ  -JM    Recorded by [LB] Sowmya Felix, PT Student  [JM] Pedro Jorge, PT    Positioning and Restraints    Pre-Treatment Position (P)  in bed  -LB in bed  -JM in bed  -JM    Post Treatment  Position (P)  bed  -LB [facility]  - chair  -JM    In Bed (P)  notified nsg;supine;call light within reach;encouraged to call for assist;exit alarm on  -LB      In Chair   notified nsg;reclined;call light within reach;encouraged to call for assist;exit alarm on;legs elevated   nurse removed JAYSHREE hose  -JM    On BS commode  notified nsg;sitting;call light within reach;encouraged to call for assist;L knee immobilizer   PCT notified; call bell in hand  -     Recorded by [LB] Sowmya Felix, PT Student [] Pedro Jorge, PT [JM] Pedro Jorge, PT      02/18/17 1503          Rehab Assessment/Intervention    Discipline physical therapist  -      Document Type therapy note (daily note)  -      Subjective Information agree to therapy;no complaints  -      Patient Effort, Rehab Treatment good  -      Precautions/Limitations fall precautions   Nerve block catheter; KI when up; impulsive  -      Recorded by [JM] Pedro Jorge, PT      Pain Assessment    Pain Assessment 0-10  -      Pain Score 2  -      Post Pain Score 2  -      Pain Type Surgical pain  -      Pain Location Knee  -      Pain Orientation Left  -      Recorded by [JM] Pedro Jorge, PT      Cognitive Assessment/Intervention    Current Cognitive/Communication Assessment impaired  -      Orientation Status oriented x 4  -      Follows Commands/Answers Questions 50% of the time  -      Personal Safety moderate impairment;decreased awareness, need for assist;decreased awareness, need for safety;decreased insight to deficits  -      Personal Safety Interventions fall prevention program maintained;gait belt;muscle strengthening facilitated;nonskid shoes/slippers when out of bed  -      Recorded by [JM] Pedro Jorge, PT      Bed Mobility, Assessment/Treatment    Bed Mobility, Assistive Device head of bed elevated  -      Bed Mob, Supine to Sit, [place] contact guard assist  -      Recorded by [JM] Pedro Jorge, PT      Transfer  Assessment/Treatment    Transfers, Sit-Stand [place] minimum assist (75% patient effort);verbal cues required  -      Transfers, Sit-Stand-Sit, Assist Device rolling walker  -      Transfer, Comment --   Impulsive, VCs for hand placement, and safe technique  -      Recorded by [JM] Pedro Jorge, PT      Gait Assessment/Treatment    Gait, [place] Level contact guard assist;verbal cues required  -      Gait, Assistive Device rolling walker  -      Gait, Distance (Feet) 45  -      Gait, Gait Pattern Analysis swing-to gait  -      Gait, Gait Deviations forward flexed posture;antalgic;limb motion velocity decreased   Shuffling to advance feet  -      Recorded by [ELAS] Pedro Jorge, PT      Therapy Exercises    Exercise Protocols total knee  -      Total Knee Exercises left:;10 reps;completed protocol;with assist;ankle pumps/circles;quad set;glut set;heel slides;SLR;SAQ;hip abduction/adduction  -      Recorded by [ELSA] Pedro Jorge, PT      Positioning and Restraints    Pre-Treatment Position in bed  -      Post Treatment Position chair  -JM      In Chair notified nsg;reclined;call light within reach;encouraged to call for assist;exit alarm on;LLE elevated  -JM      Recorded by [JM] Pedro Jorge, PT        User Key  (r) = Recorded By, (t) = Taken By, (c) = Cosigned By    Initials Name Effective Dates     Pedro Jorge, PT 06/19/15 -     LB Sowmya Felix, PT Student 03/23/16 -                 IP PT Goals       02/20/17 1139 02/19/17 1436 02/19/17 0902    Bed Mobility PT LTG    Bed Mobility PT LTG, Date Established (P)  02/20/17  -LB      Bed Mobility PT LTG, Date Goal Reviewed  02/19/17  -JM 02/19/17  -    Bed Mobility PT LTG, Outcome (P)  goal met  -LB goal ongoing  - goal ongoing  -    Transfer Training PT LTG    Transfer Training PT LTG, Date Established (P)  02/20/17  -LB      Transfer Training PT  LTG, Date Goal Reviewed  02/19/17  -JM 02/19/17  -    Transfer Training PT  LTG, Outcome (P)  goal ongoing  -LB goal ongoing  - goal ongoing  -    Gait Training PT LTG    Gait Training Goal PT LTG, Date Established (P)  02/20/17  -LB      Gait Training Goal PT LTG, Date Goal Reviewed  02/19/17  - 02/19/17  -    Gait Training Goal PT LTG, Outcome (P)  goal ongoing  -LB goal ongoing  - goal ongoing  -    Range of Motion PT LTG    Range of Motion Goal PT LTG, Date Established (P)  02/20/17  -LB      Range of Motion Goal PT LTG, Date Goal Reviewed  02/19/17  -JM 02/19/17  -    Range of Motion Goal PT LTG, Outcome (P)  goal ongoing  -LB goal ongoing  - goal ongoing  -    Patient Education PT LTG    Patient Education PT LTG, Date Established (P)  02/20/17  -      Patient Education PT LTG, Date Goal Reviewed  02/19/17  - 02/19/17  -    Patient Education PT LTG Outcome (P)  goal ongoing  - goal ongoing  - goal ongoing  -      02/18/17 1532 02/18/17 0917       Bed Mobility PT LTG    Bed Mobility PT LTG, Date Established  02/18/17  -     Bed Mobility PT LTG, Time to Achieve  5 days  -     Bed Mobility PT LTG, Activity Type  supine to sit/sit to supine  -     Bed Mobility PT LTG, [place] Level  conditional independence  -     Bed Mobility PT LTG, Date Goal Reviewed 02/18/17  - 02/18/17  -     Bed Mobility PT LTG, Outcome goal ongoing  - goal ongoing  -     Transfer Training PT LTG    Transfer Training PT LTG, Date Established  02/18/17  -     Transfer Training PT LTG, Time to Achieve  5 days  -     Transfer Training PT LTG, Activity Type  bed to chair /chair to bed;sit to stand/stand to sit  -     Transfer Training PT LTG, [place] Level  supervision required  -     Transfer Training PT LTG, Assist Device  walker, rolling  -     Transfer Training PT  LTG, Date Goal Reviewed 02/18/17  - 02/18/17  -     Transfer Training PT LTG, Outcome goal ongoing  - goal ongoing  -     Gait Training PT LTG    Gait Training Goal PT LTG, Date  Established  02/18/17  -     Gait Training Goal PT LTG, Time to Achieve  5 days  -JM     Gait Training Goal PT LTG, [place] Level  supervision required  -     Gait Training Goal PT LTG, Assist Device  walker, rolling  -     Gait Training Goal PT LTG, Distance to Achieve  250  -JM     Gait Training Goal PT LTG, Date Goal Reviewed 02/18/17  - 02/18/17  -     Gait Training Goal PT LTG, Outcome goal ongoing  - goal ongoing  -     Range of Motion PT LTG    Range of Motion Goal PT LTG, Date Established  02/18/17  -     Range of Motion Goal PT LTG, Time to Achieve  5 days  -JM     Range fo Motion Goal PT LTG, Joint  L knee  -     Range of Motion Goal PT LTG, AROM Measure  0-110  -JM     Range of Motion Goal PT LTG, Date Goal Reviewed 02/18/17  - 02/18/17  -     Range of Motion Goal PT LTG, Outcome goal ongoing  - goal ongoing  -     Patient Education PT LTG    Patient Education PT LTG, Date Established  02/18/17  -     Patient Education PT LTG, Time to Achieve  5 days  -     Patient Education PT LTG, Education Type  HEP;precaution per surgeon;gait;transfers;bed mobility;benefits of activity;pain management  -     Patient Education PT LTG, Education Understanding  demonstrate adequately;verbalize understanding  -     Patient Education PT LTG, Date Goal Reviewed 02/18/17  - 02/18/17  -     Patient Education PT LTG Outcome goal ongoing  - goal ongoing  -       User Key  (r) = Recorded By, (t) = Taken By, (c) = Cosigned By    Initials Name Provider Type    ELSA Jorge, PT Physical Therapist    MALIK Felix, PT Student PT Student          Physical Therapy Education     Title: PT OT SLP Therapies (Active)     Topic: Physical Therapy (Done)     Point: Mobility training (Done)    Learning Progress Summary    Learner Readiness Method Response Comment Documented by Status   Patient Acceptance ACACIA MONTENEGRO,NR Pt. required multiple cues and repetition during performance of exercises.   02/20/17 1137 Done    Acceptance E VU,NR   02/19/17 1436 Done    Acceptance E VU,NR   02/19/17 0901 Done    Acceptance E VU,NR   02/18/17 1532 Done    Acceptance E VU,NR   02/18/17 0915 Done               Point: Home exercise program (Done)    Learning Progress Summary    Learner Readiness Method Response Comment Documented by Status   Patient Acceptance E,D DU,NR Pt. required multiple cues and repetition during performance of exercises.  02/20/17 1137 Done    Acceptance E VU,NR   02/19/17 1436 Done    Acceptance E VU,NR   02/19/17 0901 Done    Acceptance E VU,NR   02/18/17 1532 Done    Acceptance E VU,NR   02/18/17 0915 Done               Point: Body mechanics (Done)    Learning Progress Summary    Learner Readiness Method Response Comment Documented by Status   Patient Acceptance E,D DU,NR Pt. required multiple cues and repetition during performance of exercises.  02/20/17 1137 Done    Acceptance E VU,NR   02/19/17 1436 Done    Acceptance E VU,NR   02/19/17 0901 Done    Acceptance E VU,NR   02/18/17 1532 Done    Acceptance E VU,NR   02/18/17 0915 Done               Point: Precautions (Done)    Learning Progress Summary    Learner Readiness Method Response Comment Documented by Status   Patient Acceptance E,D DU,NR Pt. required multiple cues and repetition during performance of exercises.  02/20/17 1137 Done    Acceptance E VU,NR   02/19/17 1436 Done    Acceptance E VU,NR   02/19/17 0901 Done    Acceptance E VU,NR   02/18/17 1532 Done    Acceptance E VU,NR   02/18/17 0915 Done                      User Key     Initials Effective Dates Name Provider Type Discipline     06/19/15 -  Pedro Jorge, PT Physical Therapist PT     03/23/16 -  Sowmya Felix, PT Student PT Student PT                    PT Recommendation and Plan  Anticipated Discharge Disposition: home with home health, home with assist  Planned Therapy Interventions: balance training, bed mobility training, gait  training, home exercise program, patient/family education, strengthening, transfer training  PT Frequency: 2 times/day, per priority policy  Plan of Care Review  Plan Of Care Reviewed With: (P) patient  Progress: (P) progress toward functional goals as expected  Outcome Summary/Follow up Plan: (P) Pt. ambulated a greater distance (100 ft.) and showed increased independence with bed mobility and transfers. Required multiple VC , demonstration, and repetition throughout PT session due to hardness of hearing and confusion.           Outcome Measures       02/20/17 1040 02/19/17 1410 02/19/17 0813    How much help from another person do you currently need...    Turning from your back to your side while in flat bed without using bedrails? (P)  4  -LB 3  -JM 3  -JM    Moving from lying on back to sitting on the side of a flat bed without bedrails? (P)  4  -LB 3  -JM 3  -JM    Moving to and from a bed to a chair (including a wheelchair)? (P)  3  -LB 3  -JM 3  -JM    Standing up from a chair using your arms (e.g., wheelchair, bedside chair)? (P)  3  -LB 3  -JM 3  -JM    Climbing 3-5 steps with a railing? (P)  2  -LB 2  -JM 2  -JM    To walk in hospital room? (P)  3  -LB 3  -JM 3  -JM    AM-PAC 6 Clicks Score (P)  19  -LB 17  -JM 17  -JM    Functional Assessment    Outcome Measure Options (P)  AM-PAC 6 Clicks Basic Mobility (PT)  -LB AM-PAC 6 Clicks Basic Mobility (PT)  -JM AM-PAC 6 Clicks Basic Mobility (PT)  -      02/18/17 1503 02/18/17 0812 02/18/17 0810    How much help from another person do you currently need...    Turning from your back to your side while in flat bed without using bedrails? 3  -JM 3  -JM     Moving from lying on back to sitting on the side of a flat bed without bedrails? 3  -JM 3  -JM     Moving to and from a bed to a chair (including a wheelchair)? 3  -JM 3  -JM     Standing up from a chair using your arms (e.g., wheelchair, bedside chair)? 3  -JM 3  -JM     Climbing 3-5 steps with a railing? 2   -JM 2  -JM     To walk in hospital room? 3  -JM 3  -JM     AM-PAC 6 Clicks Score 17  -JM 17  -JM     How much help from another is currently needed...    Putting on and taking off regular lower body clothing?   2  -AC    Bathing (including washing, rinsing, and drying)   2  -AC    Toileting (which includes using toilet bed pan or urinal)   3  -AC    Putting on and taking off regular upper body clothing   3  -AC    Taking care of personal grooming (such as brushing teeth)   3  -AC    Eating meals   4  -AC    Score   17  -AC    Functional Assessment    Outcome Measure Options AM-PAC 6 Clicks Basic Mobility (PT)  - AM-PAC 6 Clicks Basic Mobility (PT)  - AM-PAC 6 Clicks Daily Activity (OT)  -      User Key  (r) = Recorded By, (t) = Taken By, (c) = Cosigned By    Initials Name Provider Type    AC Gely Adams, OT Occupational Therapist    ELSA Jorge, PT Physical Therapist    MALIK Felix, PT Student PT Student           Time Calculation:         PT Charges       02/20/17 1144          Time Calculation    Start Time (P)  1040  -LB      PT Received On (P)  02/20/17  -LB      PT Goal Re-Cert Due Date (P)  03/02/17  -LB      Time Calculation- PT    Total Timed Code Minutes- PT (P)  24 minute(s)  -LB        User Key  (r) = Recorded By, (t) = Taken By, (c) = Cosigned By    Initials Name Provider Type    MALIK Felix, PT Student PT Student          Therapy Charges for Today     Code Description Service Date Service Provider Modifiers Qty    [id number] HC GAIT TRAINING EA 15 MIN 2/20/2017 Sowmya Felix, PT Student GP 1    [id number]  PT THER PROC EA 15 MIN 2/20/2017 Sowmya Felix, PT Student GP 1          PT G-Codes  Outcome Measure Options: (P) AM-PAC 6 Clicks Basic Mobility (PT)    Sowmya Felix PT Student  2/20/2017

## 2017-02-21 VITALS
HEART RATE: 68 BPM | HEIGHT: 62 IN | BODY MASS INDEX: 29.81 KG/M2 | WEIGHT: 162 LBS | TEMPERATURE: 98.4 F | OXYGEN SATURATION: 98 % | DIASTOLIC BLOOD PRESSURE: 68 MMHG | RESPIRATION RATE: 18 BRPM | SYSTOLIC BLOOD PRESSURE: 158 MMHG

## 2017-02-21 LAB
GLUCOSE BLDC GLUCOMTR-MCNC: 107 MG/DL (ref 70–130)
GLUCOSE BLDC GLUCOMTR-MCNC: 93 MG/DL (ref 70–130)

## 2017-02-21 PROCEDURE — 97530 THERAPEUTIC ACTIVITIES: CPT | Performed by: OCCUPATIONAL THERAPIST

## 2017-02-21 PROCEDURE — 82962 GLUCOSE BLOOD TEST: CPT

## 2017-02-21 PROCEDURE — 25010000002 ENOXAPARIN PER 10 MG: Performed by: ORTHOPAEDIC SURGERY

## 2017-02-21 PROCEDURE — 25010000002 ROPIVACAINE PER 1 MG: Performed by: NURSE ANESTHETIST, CERTIFIED REGISTERED

## 2017-02-21 PROCEDURE — 97110 THERAPEUTIC EXERCISES: CPT

## 2017-02-21 PROCEDURE — 97116 GAIT TRAINING THERAPY: CPT

## 2017-02-21 RX ORDER — DOCUSATE SODIUM 100 MG/1
100 CAPSULE, LIQUID FILLED ORAL 2 TIMES DAILY
Qty: 60 CAPSULE | Refills: 0
Start: 2017-02-21

## 2017-02-21 RX ORDER — HYDROCODONE BITARTRATE AND ACETAMINOPHEN 7.5; 325 MG/1; MG/1
1 TABLET ORAL EVERY 4 HOURS PRN
Qty: 10 TABLET | Refills: 0 | Status: SHIPPED | OUTPATIENT
Start: 2017-02-21 | End: 2017-02-27

## 2017-02-21 RX ADMIN — ROPIVACAINE HYDROCHLORIDE 10 ML/HR: 2 INJECTION, SOLUTION EPIDURAL; INFILTRATION at 06:29

## 2017-02-21 RX ADMIN — LEVOTHYROXINE SODIUM 112 MCG: 112 TABLET ORAL at 05:12

## 2017-02-21 RX ADMIN — AMIODARONE HYDROCHLORIDE 100 MG: 200 TABLET ORAL at 08:33

## 2017-02-21 RX ADMIN — SERTRALINE HYDROCHLORIDE 50 MG: 50 TABLET ORAL at 08:33

## 2017-02-21 RX ADMIN — PANTOPRAZOLE SODIUM 40 MG: 40 TABLET, DELAYED RELEASE ORAL at 05:12

## 2017-02-21 RX ADMIN — OXYBUTYNIN CHLORIDE 5 MG: 5 TABLET, EXTENDED RELEASE ORAL at 08:33

## 2017-02-21 RX ADMIN — HYDROCODONE BITARTRATE AND ACETAMINOPHEN 1 TABLET: 7.5; 325 TABLET ORAL at 00:40

## 2017-02-21 RX ADMIN — ENOXAPARIN SODIUM 40 MG: 40 INJECTION SUBCUTANEOUS at 08:33

## 2017-02-21 NOTE — THERAPY DISCHARGE NOTE
Acute Care - Physical Therapy Treatment Note/Discharge  [facility]     Patient Name: Radha Ruggiero  : 7/3/1932  MRN: [id number]  Today's Date: 2017  Onset of Illness/Injury or Date of Surgery Date: 17  Date of Referral to PT: 17  Referring Physician: Vinicius    Admit Date: 2017    Visit Dx:    ICD-10-CM ICD-9-CM   1. Impaired functional mobility, balance, gait, and endurance Z74.09 V49.89   2. Loose total knee arthroplasty T84.038A 996.41    Z96.659      Patient Active Problem List   Diagnosis   • Loose total knee arthroplasty   • S/P revision of total replacement of left knee   • A-fib   • HTN (hypertension)   • Prediabetes   • Acute blood loss anemia, mild, asymptomatic       Physical Therapy Education     Title: PT OT SLP Therapies (Active)     Topic: Physical Therapy (Done)     Point: Mobility training (Done)    Learning Progress Summary    Learner Readiness Method Response Comment Documented by Status   Patient Eager E KANNAN,NR,VU Reviewed safety with mobility  hep,benifits of activity SC 17 1541 Done    Acceptance E,D DU,NR Continuous VC, demonstration, and repetition necessary.  17 1535 Done    Acceptance E,D DU,NR Pt. required multiple cues and repetition during performance of exercises.  17 1137 Done    Acceptance E VU,NR   17 1436 Done    Acceptance E VU,NR   17 0901 Done    Acceptance E VU,NR   17 1532 Done    Acceptance E VU,NR   17 0915 Done               Point: Home exercise program (Done)    Learning Progress Summary    Learner Readiness Method Response Comment Documented by Status   Patient Eager E DU,NR,VU Reviewed safety with mobility  hep,benifits of activity SC 17 1541 Done    Acceptance E,D DU,NR Continuous VC, demonstration, and repetition necessary.  17 1535 Done    Acceptance E,D DU,NR Pt. required multiple cues and repetition during performance of exercises.  17 1137 Done    Acceptance E  VU,NR   02/19/17 1436 Done    Acceptance E VU,NR   02/19/17 0901 Done    Acceptance E VU,NR   02/18/17 1532 Done    Acceptance E VU,NR   02/18/17 0915 Done               Point: Body mechanics (Done)    Learning Progress Summary    Learner Readiness Method Response Comment Documented by Status   Patient Eager E DU,NR,VU Reviewed safety with mobility  hep,benifits of activity SC 02/21/17 1541 Done    Acceptance E,D DU,NR Continuous VC, demonstration, and repetition necessary.  02/20/17 1535 Done    Acceptance E,D DU,NR Pt. required multiple cues and repetition during performance of exercises.  02/20/17 1137 Done    Acceptance E VU,NR   02/19/17 1436 Done    Acceptance E VU,NR   02/19/17 0901 Done    Acceptance E VU,NR   02/18/17 1532 Done    Acceptance E VU,NR   02/18/17 0915 Done               Point: Precautions (Done)    Learning Progress Summary    Learner Readiness Method Response Comment Documented by Status   Patient Eager E DU,NR,VU Reviewed safety with mobility  hep,benifits of activity SC 02/21/17 1541 Done    Acceptance E,D DU,NR Continuous VC, demonstration, and repetition necessary.  02/20/17 1535 Done    Acceptance E,D DU,NR Pt. required multiple cues and repetition during performance of exercises.  02/20/17 1137 Done    Acceptance E VU,NR   02/19/17 1436 Done    Acceptance E VU,NR   02/19/17 0901 Done    Acceptance E VU,NR   02/18/17 1532 Done    Acceptance E VU,NR   02/18/17 0915 Done                      User Key     Initials Effective Dates Name Provider Type Discipline    SC 06/19/15 -  Arslan Bateman, PT Physical Therapist PT     06/19/15 -  Pedro Jorge, PT Physical Therapist PT     03/23/16 -  Sowmya Felix, PT Student PT Student PT                    IP PT Goals       02/21/17 1543 02/20/17 1531 02/20/17 1139    Bed Mobility PT LTG    Bed Mobility PT LTG, Date Established   02/20/17  -EH (r) LB (t) EH (c)    Bed Mobility PT LTG, Outcome   goal met  -EH (r) LB  (t) EH (c)    Transfer Training PT LTG    Transfer Training PT LTG, Date Established  02/20/17  -EH (r) LB (t) EH (c) 02/20/17  -EH (r) LB (t) EH (c)    Transfer Training PT LTG, Outcome goal not met  -SC goal ongoing  -EH (r) LB (t) EH (c) goal ongoing  -EH (r) LB (t) EH (c)    Gait Training PT LTG    Gait Training Goal PT LTG, Date Established  02/20/17  -EH (r) LB (t) EH (c) 02/20/17  -EH (r) LB (t) EH (c)    Gait Training Goal PT LTG, Outcome goal partially met  -SC goal ongoing  -EH (r) LB (t) EH (c) goal ongoing  -EH (r) LB (t) EH (c)    Range of Motion PT LTG    Range of Motion Goal PT LTG, Date Established  02/20/17  -EH (r) LB (t) EH (c) 02/20/17  -EH (r) LB (t) EH (c)    Range of Motion Goal PT LTG, Outcome goal partially met  -SC goal ongoing  -EH (r) LB (t) EH (c) goal ongoing  -EH (r) LB (t) EH (c)    Patient Education PT LTG    Patient Education PT LTG, Date Established  02/20/17  -EH (r) LB (t) EH (c) 02/20/17  -EH (r) LB (t) EH (c)    Patient Education PT LTG Outcome goal ongoing  -SC goal ongoing  -EH (r) LB (t) EH (c) goal ongoing  -EH (r) LB (t) EH (c)    Patient Education PT LTG, Reason Goal Not Met discharged from facility  -SC        02/19/17 1436 02/19/17 0902 02/18/17 1532    Bed Mobility PT LTG    Bed Mobility PT LTG, Date Goal Reviewed 02/19/17  -JM 02/19/17  - 02/18/17  -    Bed Mobility PT LTG, Outcome goal ongoing  - goal ongoing  - goal ongoing  -    Transfer Training PT LTG    Transfer Training PT  LTG, Date Goal Reviewed 02/19/17  -JM 02/19/17  -JM 02/18/17  -    Transfer Training PT LTG, Outcome goal ongoing  -JM goal ongoing  -JM goal ongoing  -JM    Gait Training PT LTG    Gait Training Goal PT LTG, Date Goal Reviewed 02/19/17  -ELSA 02/19/17  -JM 02/18/17  -JM    Gait Training Goal PT LTG, Outcome goal ongoing  -JM goal ongoing  -JM goal ongoing  -JM    Range of Motion PT LTG    Range of Motion Goal PT LTG, Date Goal Reviewed 02/19/17  -ELSA 02/19/17  -ELSA 02/18/17  -ELSA     Range of Motion Goal PT LTG, Outcome goal ongoing  - goal ongoing  - goal ongoing  -    Patient Education PT LTG    Patient Education PT LTG, Date Goal Reviewed 02/19/17  - 02/19/17  - 02/18/17  -    Patient Education PT LTG Outcome goal ongoing  - goal ongoing  - goal ongoing  -      02/18/17 0917          Bed Mobility PT LTG    Bed Mobility PT LTG, Date Established 02/18/17  -      Bed Mobility PT LTG, Time to Achieve 5 days  -      Bed Mobility PT LTG, Activity Type supine to sit/sit to supine  -      Bed Mobility PT LTG, [place] Level conditional independence  -      Bed Mobility PT LTG, Date Goal Reviewed 02/18/17  -      Bed Mobility PT LTG, Outcome goal ongoing  -      Transfer Training PT LTG    Transfer Training PT LTG, Date Established 02/18/17  -      Transfer Training PT LTG, Time to Achieve 5 days  -JM      Transfer Training PT LTG, Activity Type bed to chair /chair to bed;sit to stand/stand to sit  -      Transfer Training PT LTG, [place] Level supervision required  -      Transfer Training PT LTG, Assist Device walker, rolling  -      Transfer Training PT  LTG, Date Goal Reviewed 02/18/17  -      Transfer Training PT LTG, Outcome goal ongoing  -      Gait Training PT LTG    Gait Training Goal PT LTG, Date Established 02/18/17  -      Gait Training Goal PT LTG, Time to Achieve 5 days  -JM      Gait Training Goal PT LTG, [place] Level supervision required  -      Gait Training Goal PT LTG, Assist Device walker, rolling  -      Gait Training Goal PT LTG, Distance to Achieve 250  -JM      Gait Training Goal PT LTG, Date Goal Reviewed 02/18/17  -      Gait Training Goal PT LTG, Outcome goal ongoing  -      Range of Motion PT LTG    Range of Motion Goal PT LTG, Date Established 02/18/17  -      Range of Motion Goal PT LTG, Time to Achieve 5 days  -JM      Range fo Motion Goal PT LTG, Joint L knee  -      Range of Motion Goal PT LTG, AROM  Measure 0-110  -      Range of Motion Goal PT LTG, Date Goal Reviewed 02/18/17  -      Range of Motion Goal PT LTG, Outcome goal ongoing  -      Patient Education PT LTG    Patient Education PT LTG, Date Established 02/18/17  -      Patient Education PT LTG, Time to Achieve 5 days  -      Patient Education PT LTG, Education Type HEP;precaution per surgeon;gait;transfers;bed mobility;benefits of activity;pain management  -      Patient Education PT LTG, Education Understanding demonstrate adequately;verbalize understanding  -      Patient Education PT LTG, Date Goal Reviewed 02/18/17  -      Patient Education PT LTG Outcome goal ongoing  -        User Key  (r) = Recorded By, (t) = Taken By, (c) = Cosigned By    Initials Name Provider Type    SC Arslan Bateman, PT Physical Therapist    ANNALEE Chacko, PT Physical Therapist    ELSA Jorge, PT Physical Therapist    MALIK Felix, PT Student PT Student              Adult Rehabilitation Note       02/21/17 0940 02/21/17 0913 02/20/17 1415    Rehab Assessment/Intervention    Discipline physical therapist  -SC occupational therapist  -AR physical therapist  -,LB,EH2    Document Type discharge evaluation/summary;therapy note (daily note)  -SC therapy note (daily note);discharge summary   POD#4 revision left TKR  -AR therapy note (daily note)  -,LB,EH2    Subjective Information no complaints  -SC no complaints;agree to therapy  -AR no complaints;agree to therapy  -,LB,EH2    Patient Effort, Rehab Treatment good  -SC good  -AR good  -EH,LB,EH2    Precautions/Limitations fall precautions  -SC fall precautions;other (see comments)   impulsive, poor safety awareness  -AR fall precautions   nerve catheter; pt. prone to impulsiveness  -EH,LB,EH2    Patient Response to Treatment  pt with poor short-term memory/confusion  -AR     Recorded by [SC] Arslan Bateman, PT [AR] Saadia Pinedo, OT [,LB,EH2] Dejah Chacko, PT (r) Sowmya Felix,  PT Student (t) Dejah Chacko, PT (c)    Pain Assessment    Pain Assessment 0-10  -SC No/denies pain  -AR [place] FACES  -EH,LB,EH2    [place] FACES Pain Rating 4  -SC      Pain Score 4  -SC  3  -EH,LB,EH2    Post Pain Score   3  -EH,LB,EH2    Pain Type Acute pain  -SC  Surgical pain  -EH,LB,EH2    Pain Location Knee  -SC  Knee  -EH,LB,EH2    Pain Orientation Left  -SC  Left  -EH,LB,EH2    Pain Intervention(s) Repositioned  -SC  Repositioned;Ambulation/increased activity  -EH,LB,EH2    Response to Interventions   Tolerated  -EH,LB,EH2    Recorded by [SC] Arslan Bateman, PT [AR] Saadia Pinedo, OT [EH,LB,EH2] Dejah Chacko, PT (r) Sowmya Felix, PT Student (t) Dejah Chacko, PT (c)    Cognitive Assessment/Intervention    Current Cognitive/Communication Assessment impaired  -SC impaired  -AR impaired  -EH,LB,EH2    Orientation Status oriented to;person;place;situation  -SC oriented to;person;place;situation   pt aware of her confusion  -AR oriented to;person;situation  -EH,LB,EH2    Follows Commands/Answers Questions 75% of the time;able to follow single-step instructions  -SC 75% of the time;able to follow single-step instructions;needs cueing;other (see comments)   impaired attention  -AR 75% of the time;able to follow single-step instructions;needs cueing;needs repetition  -EH,LB,EH2    Personal Safety moderate impairment  -SC moderate impairment;impulsive;unaware of functional deficits  -AR mild impairment;impulsive  -EH,LB,EH2    Personal Safety Interventions fall prevention program maintained  -SC fall prevention program maintained  -AR gait belt;fall prevention program maintained;elopement precautions initiated;nonskid shoes/slippers when out of bed  -EH,LB,EH2    Additional Documentation   --   Pt. speaks incoherently at times  -EH,LB,EH2    Recorded by [SC] Arslan Bateman, PT [AR] Saadia Pinedo, OT [EH,LB,EH2] Dejah Chacko, PT (r) Sowmya Felix, PT Student (t) Dejah ESCOBEDO  Ricco, PT (c)    General LE Assessment    ROM Detail --   L knee 0-90  -SC      Recorded by [SC] Arslan Bateman, PT      Mobility Assessment/Training    Extremity Weight-Bearing Status  left lower extremity  -AR     Left Lower Extremity Weight-Bearing  weight-bearing as tolerated  -AR     Recorded by  [AR] Saadia Pinedo, OT     Bed Mobility, Assessment/Treatment    Bed Mobility, Assistive Device  bed rails;head of bed elevated  -AR     Bed Mob, Supine to Sit, [place]  conditional independence  -AR     Bed Mobility, Impairments  strength decreased  -AR     Bed Mobility, Comment up in chair  -SC pt declined needing leg   -AR defered   Pt. UIC upon PT arrival  -EH,LB,EH2    Recorded by [SC] Arslan Bateman, PT [AR] Saadia Pinedo, OT [EH,LB,EH2] Dejah Chacko, PT (r) Sowmya Felix, PT Student (t) Dejah Chacko, PT (c)    Transfer Assessment/Treatment    Transfers, Bed-Chair [place]  contact guard assist  -AR contact guard assist  -EH,LB,EH2    Transfers, Bed-Chair-Bed, Assist Device  elevated surface;rolling walker  -AR rolling walker  -EH,LB,EH2    Transfers, Sit-Stand [place] stand by assist;verbal cues required  -SC verbal cues required;contact guard assist  -AR contact guard assist;verbal cues required  -EH,LB,EH2    Transfers, Stand-Sit [place] verbal cues required;stand by assist  -SC verbal cues required;contact guard assist  -AR contact guard assist;verbal cues required  -EH,LB,EH2    Transfers, Sit-Stand-Sit, Assist Device rolling walker  -SC  rolling walker  -EH,LB,EH2    Toilet Transfer, [place]   minimum assist (75% patient effort);verbal cues required   Pt. had a bout of diarrhea at beginning of PT session  -EH,LB,EH2    Toilet Transfer, Assistive Device   rolling walker   Pt. doesn't reach back or lower slowly despite education   -EH,LB,EH2    Transfer, Safety Issues   impulsivity;balance decreased during turns;sequencing ability decreased  -EH,LB,EH2     Transfer, Impairments pain  -SC sensation decreased;strength decreased  -AR strength decreased;pain  -EH,LB,EH2    Transfer, Comment reminders for hand placement  -SC Pt impuslive and attempting to stand prior to application of gait belt. Pt with poor safety, attempting to stand without walker. Pt requires cues for safe chair approach and to advance LLE during stand-to-sit transition.   -AR Repetition of multiple VCs required for hand placement, posture, and safety measures   Pt. demonstrated impulsiveness when performing sit-stand  -EH,LB,EH2    Recorded by [SC] Arslan Bateman, PT [AR] Saadia Pinedo, OT [EH,LB,EH2] Dejah Chacko, PT (r) Sowmya Felix, PT Student (t) Dejah Chacko PT (c)    Gait Assessment/Treatment    Gait, [place] Level contact guard assist  -SC  contact guard assist;verbal cues required  -EH,LB,EH2    Gait, Assistive Device rolling walker  -SC  rolling walker  -EH,LB,EH2    Gait, Distance (Feet) 300  -SC  140  -EH,LB,EH2    Gait, Gait Pattern Analysis swing-through gait  -SC  swing-to gait   Pt. alternated b/t swing-to and swing-through  -EH,LB,EH2    Gait, Gait Deviations left:;antalgic;lola decreased;forward flexed posture  -SC  left:;antalgic;lola decreased;decreased heel strike;right:;step length decreased;bilateral:;double stance time increased;forward flexed posture;limb motion velocity decreased  -EH,LB,EH2    Gait, Safety Issues balance decreased during turns  -SC  balance decreased during turns;step length decreased;sequencing ability decreased;weight-shifting ability decreased  -EH,LB,EH2    Gait, Impairments pain  -SC  strength decreased;pain;impaired balance  -EH,LB,EH2    Gait, Comment cues for alternating gait. Not consistent with this. Cues for improving posture--unable to sustain this  -SC  VC required for sequencing, taking longer step lengths on RLE, posture, guiding the walker, pushing more weight through UEs  -EH,LB,EH2    Recorded by [SC] Arslan ESCOBEDO  Fransico, PT  [EH,LB,EH2] Dejah Chacko, PT (r) Sowmya Felix, PT Student (t) Dejah Chacko, PT (c)    Functional Mobility    Functional Mobility- Ind. Level  verbal cues required;contact guard assist  -AR     Functional Mobility- Device  rolling walker  -AR     Functional Mobility-Distance (Feet)  5  -AR     Functional Mobility- Safety Issues  weight-shifting ability decreased;balance decreased during turns  -AR     Functional Mobility- Comment  pt attempting to ambulate while holding cell phone, taking hand off walker and overall poor attention to safety and cues to follow safety technqiues to ensure safe transfer.   -AR     Recorded by  [AR] Saadia Pinedo, OT     Lower Body Bathing Assessment/Training    LB Bathing Assess/Train, Comment  Pt states she has LH sponge and declined need for replacement  -AR     Recorded by  [AR] Saadia Pinedo, OT     Upper Body Dressing Assessment/Training    UB Dressing Assess/Train, Clothing Type  donning:   robe  -AR     UB Dressing Assess/Train, Position  edge of bed  -AR     UB Dressing Assess/Train, [place]  supervision required  -AR     UB Dressing Assess/Train, Comment  pt anticiaptes DC to SNF for rehab, she declined offer from OT to assist her with getting dressed.   -AR     Recorded by  [AR] Saadia Pinedo, OT     Lower Body Dressing Assessment/Training    LB Dressing Assess/Train, Clothing Type  donning:;slipper socks  -AR     LB Dressing Assess/Train, Position  edge of bed  -AR     LB Dressing Assess/Train, [place]  moderate assist (50% patient effort)  -AR     LB Dressing Assess/Train, Impairments  ROM decreased;pain  -AR     LB Dressing Assess/Train, Comment  Pt declined AE to assist with LB dressing, stating she has reacher and sock aide and does not need or use them. Educated her on south-dressing.   -AR     Recorded by  [AR] Saadia Pinedo, OT     Therapy Exercises    Exercise Protocols total knee  -SC  total knee  -EH,LB,EH2     "Total Knee Exercises left:;10 reps;completed protocol  -SC  left:;10 reps;completed protocol   Multiple VCs required; doesn't hold quad set; demo hip abd  -EH,LB,EH2    Recorded by [SC] Arslan Bateman, PT  [EH,LB,EH2] Dejah Chacko, PT (r) Sowmya Felix, PT Student (t) Dejah Chacko, PT (c)    Positioning and Restraints    Pre-Treatment Position sitting in chair/recliner  -SC in bed  -AR sitting in chair/recliner  -EH,LB,EH2    Post Treatment Position chair  -SC chair  -AR chair  -EH,LB,EH2    In Bed sitting;call light within reach;exit alarm on;encouraged to call for assist  -SC      In Chair  notified nsg;reclined;call light within reach;encouraged to call for assist;exit alarm on  -AR notified nsg;reclined;call light within reach;encouraged to call for assist;exit alarm on  -EH,LB,EH2    Recorded by [SC] Arslan Bateman, PT [AR] Saadia Pinedo, OT [EH,LB,EH2] Dejah Chacko, PT (r) Sowmya Felix, PT Student (t) Dejah Chacko, PT (c)      02/20/17 1133 02/20/17 1040 02/19/17 1410    Rehab Assessment/Intervention    Discipline occupational therapist  -ST physical therapist  -EH,LB,EH2 physical therapist  -    Document Type therapy note (daily note)  -ST therapy note (daily note)  -EH,LB,EH2 therapy note (daily note)  -    Subjective Information no complaints;agree to therapy  -ST agree to therapy;no complaints  -EH,LB,EH2 agree to therapy;no complaints   \"I feel better\"  -    Patient Effort, Rehab Treatment good  -ST good  -EH,LB,EH2 good  -    Precautions/Limitations fall precautions   adductor canal cath; impulsivity; confusion; exit alarm  -ST fall precautions   nerve catheter; pt. can be impulsive at times  -EH,LB,EH2 fall precautions   KI when up, nerve cath  -    Patient Response to Treatment pt confused throughout session; RN aware  -ST      Recorded by [ST] Leonora Tyson, OTR [EH,LB,EH2] Dejah Chacko, PT (r) Sowmya Felix, PT Student (t) Dejah Chacko, PT (c) " [JM] Pedro Jorge, PT    Pain Assessment    Pain Assessment 0-10  -ST [place] FACES  -EH,LB,EH2 0-10  -JM    Pain Score 3  -ST 2  -EH,LB,EH2 4  -JM    Post Pain Score 3  -ST 4  -EH,LB,EH2 4  -JM    Pain Type Surgical pain  -ST Surgical pain  -EH,LB,EH2 Surgical pain  -JM    Pain Location Knee  -ST Knee  -EH,LB,EH2 Knee  -JM    Pain Orientation Left  -ST Left  -EH,LB,EH2 Left  -JM    Pain Intervention(s) Repositioned;Ambulation/increased activity  -ST Ambulation/increased activity;Repositioned  -EH,LB,EH2 Medication (See MAR)  -    Response to Interventions  Tolerated  -EH,LB,EH2 Tolerated  -JM    Recorded by [ST] Leonora Tyson, OTR [EH,LB,EH2] Dejah Chacko, PT (r) Sowmya Felix, MARGUERITE Student (t) Dejah Chacko, PT (c) [JM] Pedro Jorge, PT    Cognitive Assessment/Intervention    Current Cognitive/Communication Assessment functional  -ST impaired   Pt. was hard of hearing and showed some signs of confusion  -EH,LB,EH2 functional  -JM    Orientation Status oriented to;person  -ST oriented to;person   Pt. did not realize she was in [facility]  -EH,LB,EH2 oriented x 4  -JM    Follows Commands/Answers Questions 50% of the time;needs cueing;needs increased time   extra cuing for safety  -ST 75% of the time;able to follow single-step instructions;needs cueing;needs repetition  -EH,LB,EH2 100% of the time  -    Personal Safety moderate impairment  -ST mild impairment  -EH,LB,EH2 WNL/WFL  -    Personal Safety Interventions elopement precautions initiated;fall prevention program maintained;gait belt;nonskid shoes/slippers when out of bed  -ST gait belt;elopement precautions initiated;fall prevention program maintained;nonskid shoes/slippers when out of bed  -EH,LB,EH2 fall prevention program maintained;gait belt;muscle strengthening facilitated;nonskid shoes/slippers when out of bed  -JM    Additional Documentation  --   Pt. thought she was going for X-rays when she was having PT  -EH,LB,EH2     Recorded by  [ST] Leonora Tyson, OTR [EH,LB,EH2] Dejah Chacko, PT (r) Sowmya Felix, PT Student (t) Dejah Chacko, PT (c) [JM] Pedro Jorge, PT    ROM (Range of Motion)    General ROM  lower extremity range of motion deficits identified  -EH,LB,EH2     General ROM Detail  70 AROM L knee flexion, lacking 3 L knee extension  -EH,LB,EH2     Recorded by  [EH,LB,EH2] Dejah Chacko, PT (r) Sowmya Felix, PT Student (t) Dejah Chacko, PT (c)     Bed Mobility, Assessment/Treatment    Bed Mobility, Assistive Device head of bed elevated  -ST head of bed elevated;bed rails  -EH,LB,EH2 head of bed elevated  -    Bed Mobility, Roll Left, [place]  conditional independence  -EH,LB,EH2     Bed Mob, Supine to Sit, [place] conditional independence  -ST conditional independence  -EH,LB,EH2 supervision required  -    Bed Mob, Sit to Supine, [place] not tested  -ST conditional independence  -EH,LB,EH2     Bed Mobility, Safety Issues decreased use of arms for pushing/pulling;decreased use of legs for bridging/pushing  -ST decreased use of legs for bridging/pushing  -EH,LB,EH2     Bed Mobility, Impairments strength decreased  -ST strength decreased;pain  -EH,LB,EH2     Bed Mobility, Comment increased time to complete task, trouble motor planning  -ST pt. performed all bed mobility w/o use of a leg   -EH,LB,EH2     Recorded by [ST] Leonora Tyson, OTR [EH,LB,EH2] Dejah Chacko, PT (r) Sowmya Felix, PT Student (t) Dejah Chacko, PT (c) [JM] Pedro Jorge, PT    Transfer Assessment/Treatment    Transfers, Bed-Chair [place]  contact guard assist  -EH,LB,EH2     Transfers, Bed-Chair-Bed, Assist Device  rolling walker  -EH,LB,EH2     Transfers, Sit-Stand [place] contact guard assist  -ST contact guard assist;verbal cues required  -EH,LB,EH2 contact guard assist   Improved safety awareness  -    Transfers, Stand-Sit [place] contact guard assist  -ST contact guard assist;verbal  cues required  -EH,LB,EH2 contact guard assist;verbal cues required   VC to slide LLE forward when sitting  -JM    Transfers, Sit-Stand-Sit, Assist Device rolling walker  -ST rolling walker  -EH,LB,EH2 rolling walker  -JM    Toilet Transfer, [place] minimum assist (75% patient effort)  -ST minimum assist (75% patient effort);verbal cues required  -EH,LB,EH2     Toilet Transfer, Assistive Device rolling walker  -ST rolling walker  -EH,LB,EH2     Transfer, Safety Issues  impulsivity;balance decreased during turns;sequencing ability decreased  -EH,LB,EH2     Transfer, Impairments  strength decreased;pain  -EH,LB,EH2     Transfer, Comment vc'ing for hand placement and transfer technique to improve overall safety  -ST VC required for hand placement and safety precautions   Pt. demonstrated impulsivity at times  -EH,LB,EH2     Recorded by [ST] Leonora Tyson OTR [EH,LB,EH2] Dejah Chacko, PT (r) Sowmya Felix, PT Student (t) Dejah Chacko, PT (c) [] Pedro Jorge, PT    Gait Assessment/Treatment    Gait, [place] Level  contact guard assist;verbal cues required  -EH,LB,EH2 contact guard assist  -JM    Gait, Assistive Device  rolling walker  -EH,LB,EH2 rolling walker  -    Gait, Distance (Feet)  100  -EH,LB,EH2 60  -    Gait, Gait Pattern Analysis  swing-to gait   gradually progressed to swing-through (RLE lagged behind)  -EH,LB,EH2 swing-to gait  -    Gait, Gait Deviations  left:;antalgic;lola decreased;decreased heel strike;right:;step length decreased;bilateral:;double stance time increased;forward flexed posture;limb motion velocity decreased  -EH,LB,EH2 antalgic;decreased heel strike;forward flexed posture;limb motion velocity decreased;step length decreased  -    Gait, Safety Issues  balance decreased during turns;sequencing ability decreased;step length decreased;weight-shifting ability decreased  -EH,LB,EH2     Gait, Impairments  strength decreased;pain;impaired balance   -EH,LB,EH2     Gait, Comment  VC required for sequencing, posture, guidance of the walker (pt. had tendency to hug the wall  -EH,LB,EH2 --   Gait distance limited by bowel urgency  -JM    Recorded by  [EH,LB,EH2] Dejah Chacko, PT (r) Sowmya Felix, PT Student (t) Dejah Chacko, PT (c) [JM] Pedro Jorge, PT    Functional Mobility    Functional Mobility- Ind. Level contact guard assist  -ST      Functional Mobility- Device rolling walker  -ST      Functional Mobility-Distance (Feet) 15  -ST      Functional Mobility- Safety Issues balance decreased during turns;sequencing ability decreased;step length decreased;weight-shifting ability decreased  -ST      Functional Mobility- Comment [facility], requiring max cuing for technique  -ST      Recorded by [ST] Leonora Tyson, OTR      Toileting Assessment/Training    Toileting Assess/Train, Assistive Device bedside commode  -ST      Toileting Assess/Train, Position sitting  -ST      Toileting Assess/Train, Indepen Level contact guard assist   for balance  -ST      Recorded by [ST] Leonora Tyson, OTR      Therapy Exercises    Exercise Protocols  total knee  -EH,LB,EH2     Total Knee Exercises  left:;10 reps;completed protocol   Continuous VC, demonstration, and repetition required  -EH,LB,EH2     Recorded by  [EH,LB,EH2] Dejah Chacko, PT (r) Sowmya Felix, PT Student (t) Dejah Chacko, PT (c)     Positioning and Restraints    Pre-Treatment Position in bed  -ST in bed  -EH,LB,EH2 in bed  -    Post Treatment Position chair  -ST bed  -EH,LB,EH2 [facility]  -    In Bed  notified nsg;supine;call light within reach;encouraged to call for assist;exit alarm on  -EH,LB,EH2     In Chair notified nsg;reclined;call light within reach;encouraged to call for assist;exit alarm on  -ST      On BS commode   notified nsg;sitting;call light within reach;encouraged to call for assist;L knee  immobilizer   PCT notified; call bell in hand  -    Recorded by [ST] Leonora Tyson, OTR [EH,LB,EH2] Dejah Chacko, PT (r) Sowmya Felix, PT Student (t) Dejah Chacko, PT (c) [] Pedro Jorge, PT      02/19/17 0813          Rehab Assessment/Intervention    Discipline physical therapist  -      Document Type therapy note (daily note)  -      Subjective Information agree to therapy;complains of;pain;nausea/vomiting  -      Patient Effort, Rehab Treatment good  -      Precautions/Limitations fall precautions   KI when up, nerve block catheter, impulsive  -JM      Recorded by [ELSA] Pedro Jorge, PT      Pain Assessment    Pain Assessment 0-10  -      Pain Score 4  -JM      Post Pain Score 5  -JM      Pain Type Surgical pain  -      Pain Location Knee  -      Pain Orientation Left  -      Pain Intervention(s) Medication (See MAR);Repositioned  -      Response to Interventions Tolerated  -JM      Recorded by [ELSA] Pedro Jorge, PT      Cognitive Assessment/Intervention    Current Cognitive/Communication Assessment impaired  -      Orientation Status oriented x 4  -JM      Follows Commands/Answers Questions 75% of the time  -      Personal Safety mild impairment  -      Personal Safety Interventions fall prevention program maintained;gait belt;muscle strengthening facilitated;nonskid shoes/slippers when out of bed  -      Recorded by [ELSA] Pedro Jorge, PT      ROM (Range of Motion)    General ROM Detail -1 PROM knee extension; 0 AROM knee extension; 98 AROM knee flexion  -      Recorded by [ELSA] Pedro Jorge, PT      Bed Mobility, Assessment/Treatment    Bed Mobility, Assistive Device head of bed elevated  -      Bed Mob, Supine to Sit, [place] minimum assist (75% patient effort)  -      Bed Mobility, Impairments strength decreased   Pt more weak getting up for first time this AM  -JM      Recorded by [ELSA] Pedro Jorge, PT      Transfer Assessment/Treatment     Transfers, Bed-Chair [place] contact guard assist  -      Transfers, Bed-Chair-Bed, Assist Device rolling walker  -      Transfers, Sit-Stand [place] contact guard assist;verbal cues required  -      Transfers, Stand-Sit [place] contact guard assist;verbal cues required  -      Transfers, Sit-Stand-Sit, Assist Device rolling walker  -JM      Toilet Transfer, [place] minimum assist (75% patient effort)  -      Toilet Transfer, Assistive Device rolling walker  -      Recorded by [JM] Pedro Jorge, PT      Gait Assessment/Treatment    Gait, [place] Level contact guard assist  -      Gait, Assistive Device rolling walker  -      Gait, Distance (Feet) 30  -      Gait, Gait Pattern Analysis swing-to gait  -      Gait, Gait Deviations antalgic;lola decreased;decreased heel strike  -      Gait, Comment Gait distance limited by nausea today  -      Recorded by [ELSA] Pedro Jorge, PT      Therapy Exercises    Exercise Protocols total knee  -      Total Knee Exercises left:;15 reps;completed protocol;with assist;ankle pumps/circles;quad set;heel slides;SLR;SAQ  -JM      Recorded by [JM] Pedro Jorge, PT      Positioning and Restraints    Pre-Treatment Position in bed  -      Post Treatment Position chair  -JM      In Chair notified nsg;reclined;call light within reach;encouraged to call for assist;exit alarm on;legs elevated   nurse removed JAYSHREE hose  -      Recorded by [JM] Pedro Jorge, PT        User Key  (r) = Recorded By, (t) = Taken By, (c) = Cosigned By    Initials Name Effective Dates    SC Arslan Bateman, PT 06/19/15 -      Dejah Chacko, PT 06/19/15 -     ST Leonora Tyson, OTR 02/20/17 -     ELSA Jorge, PT 06/19/15 -     REAL Pinedo, OT 06/22/15 -     MALIK Felix, PT Student 03/23/16 -           PT Recommendation and Plan  Anticipated Discharge Disposition: inpatient rehabilitation facility  Planned Therapy Interventions:  balance training, bed mobility training, gait training, home exercise program, patient/family education, strengthening, transfer training  PT Frequency: 2 times/day, per priority policy  Plan of Care Review  Plan Of Care Reviewed With: patient  Progress: improving  Outcome Summary/Follow up Plan: Patient leaving for rehab. Still requires supervision with all mobility.           Outcome Measures       02/21/17 0940 02/21/17 0913 02/20/17 1415    How much help from another person do you currently need...    Turning from your back to your side while in flat bed without using bedrails? 4  -SC  4  -EH (r) LB (t) EH (c)    Moving from lying on back to sitting on the side of a flat bed without bedrails? 4  -SC  4  -EH (r) LB (t) EH (c)    Moving to and from a bed to a chair (including a wheelchair)? 3  -SC  3  -EH (r) LB (t) EH (c)    Standing up from a chair using your arms (e.g., wheelchair, bedside chair)? 3  -SC  3  -EH (r) LB (t) EH (c)    Climbing 3-5 steps with a railing? 2  -SC  2  -EH (r) LB (t) EH (c)    To walk in hospital room? 3  -SC  3  -EH (r) LB (t) EH (c)    AM-PAC 6 Clicks Score 19  -SC  19  -EH (r) LB (t)    How much help from another is currently needed...    Putting on and taking off regular lower body clothing?  2  -AR     Bathing (including washing, rinsing, and drying)  2  -AR     Toileting (which includes using toilet bed pan or urinal)  3  -AR     Putting on and taking off regular upper body clothing  3  -AR     Taking care of personal grooming (such as brushing teeth)  3  -AR     Eating meals  4  -AR     Score  17  -AR     Functional Assessment    Outcome Measure Options AM-PAC 6 Clicks Basic Mobility (PT)  -SC AM-PAC 6 Clicks Daily Activity (OT)  -AR AM-PAC 6 Clicks Basic Mobility (PT)  -EH (r) LB (t) EH (c)      02/20/17 1133 02/20/17 1040 02/19/17 1410    How much help from another person do you currently need...    Turning from your back to your side while in flat bed without using bedrails?   4  -EH (r) LB (t) EH (c) 3  -JM    Moving from lying on back to sitting on the side of a flat bed without bedrails?  4  -EH (r) LB (t) EH (c) 3  -JM    Moving to and from a bed to a chair (including a wheelchair)?  3  -EH (r) LB (t) EH (c) 3  -JM    Standing up from a chair using your arms (e.g., wheelchair, bedside chair)?  3  -EH (r) LB (t) EH (c) 3  -JM    Climbing 3-5 steps with a railing?  2  -EH (r) LB (t) EH (c) 2  -JM    To walk in hospital room?  3  -EH (r) LB (t) EH (c) 3  -JM    AM-PAC 6 Clicks Score  19  -EH (r) LB (t) 17  -JM    How much help from another is currently needed...    Putting on and taking off regular lower body clothing? 2  -ST      Bathing (including washing, rinsing, and drying) 2  -ST      Toileting (which includes using toilet bed pan or urinal) 3  -ST      Putting on and taking off regular upper body clothing 3  -ST      Taking care of personal grooming (such as brushing teeth) 3  -ST      Eating meals 4  -ST      Score 17  -ST      Functional Assessment    Outcome Measure Options  AM-PAC 6 Clicks Basic Mobility (PT)  -EH (r) LB (t) EH (c) AM-PAC 6 Clicks Basic Mobility (PT)  -      02/19/17 0813          How much help from another person do you currently need...    Turning from your back to your side while in flat bed without using bedrails? 3  -JM      Moving from lying on back to sitting on the side of a flat bed without bedrails? 3  -JM      Moving to and from a bed to a chair (including a wheelchair)? 3  -JM      Standing up from a chair using your arms (e.g., wheelchair, bedside chair)? 3  -JM      Climbing 3-5 steps with a railing? 2  -JM      To walk in hospital room? 3  -JM      AM-PAC 6 Clicks Score 17  -JM      Functional Assessment    Outcome Measure Options AM-PAC 6 Clicks Basic Mobility (PT)  -        User Key  (r) = Recorded By, (t) = Taken By, (c) = Cosigned By    Initials Name Provider Type    NADIA Bateman, PT Physical Therapist    EH Dejah Chacko, PT  Physical Therapist    ST Leonora Tyson, OTR Occupational Therapist    ELSA Jorge, PT Physical Therapist    AR Saadia Pinedo, OT Occupational Therapist    MALIK Felix, PT Student PT Student           Time Calculation:         PT Charges       02/21/17 1549          Time Calculation    Start Time 0940  -SC      PT Received On 02/21/17  -SC      Time Calculation- PT    Total Timed Code Minutes- PT 35 minute(s)  -SC        User Key  (r) = Recorded By, (t) = Taken By, (c) = Cosigned By    Initials Name Provider Type    SC Arslan Bateman, PT Physical Therapist          Therapy Charges for Today     Code Description Service Date Service Provider Modifiers Qty    05915439819 HC GAIT TRAINING EA 15 MIN 2/21/2017 Arslan Bateman, PT GP 1    65984576148 HC PT THER PROC EA 15 MIN 2/21/2017 Arslan Bateman, PT GP 1          PT G-Codes  Outcome Measure Options: AM-PAC 6 Clicks Basic Mobility (PT)    PT Discharge Summary  Anticipated Discharge Disposition: inpatient rehabilitation facility  Reason for Discharge: Discharge from facility  Outcomes Achieved: Patient able to partially acheive established goals  Discharge Destination: SNF    Arslan Bateman, PT  2/21/2017

## 2017-02-21 NOTE — PROGRESS NOTES
[facility]    Acute pain service Inpatient Progress Note    Patient Name: Radha Ruggiero  :  7/3/1932  MRN:  [id number]        Acute Pain  Service Inpatient Progress Note:    Analgesia:Excellent  Pain Score:0/10  LOC: alert and awake  Resp Status: room air  Cardiac: VS stable  Side Effects:None  Catheter Site:clean  Cath type: peripheral nerve cath(MOOG pump)  Infusion rate: 10ml/hr  Catheter Plan:Catheter removed and tip intact      Patient happy and ready to go to [place] for Rehab and visit with friends in home town.

## 2017-02-21 NOTE — THERAPY DISCHARGE NOTE
Acute Care - Occupational Therapy Treatment Note/Discharge   [place]     Patient Name: Radha Ruggiero  : 7/3/1932  MRN: [id number]  Today's Date: 2017  Onset of Illness/Injury or Date of Surgery Date: 17  Date of Referral to OT: 17  Referring Physician: Vinicius      Admit Date: 2017    Visit Dx:     ICD-10-CM ICD-9-CM   1. Impaired functional mobility, balance, gait, and endurance Z74.09 V49.89   2. Loose total knee arthroplasty T84.038A 996.41    Z96.659      Patient Active Problem List   Diagnosis   • Loose total knee arthroplasty   • S/P revision of total replacement of left knee   • A-fib   • HTN (hypertension)   • Prediabetes   • Acute blood loss anemia, mild, asymptomatic             Adult Rehabilitation Note       17 0913 17 1415 17 1133    Rehab Assessment/Intervention    Discipline occupational therapist  -AR physical therapist  -EH,LB,EH2 occupational therapist  -ST    Document Type therapy note (daily note);discharge summary   POD#4 revision left TKR  -AR therapy note (daily note)  -EH,LB,EH2 therapy note (daily note)  -ST    Subjective Information no complaints;agree to therapy  -AR no complaints;agree to therapy  -EH,LB,EH2 no complaints;agree to therapy  -ST    Patient Effort, Rehab Treatment good  -AR good  -EH,LB,EH2 good  -ST    Precautions/Limitations fall precautions;other (see comments)   impulsive, poor safety awareness  -AR fall precautions   nerve catheter; pt. prone to impulsiveness  -EH,LB,EH2 fall precautions   adductor canal cath; impulsivity; confusion; exit alarm  -ST    Patient Response to Treatment pt with poor short-term memory/confusion  -AR  pt confused throughout session; RN aware  -ST    Recorded by [AR] Saadia Pinedo, OT [EH,LB,EH2] Dejah Chacko, PT (r) Sowmya Felix, PT Student (t) Dejah Chacko, PT (c) [ST] Leonora Tyson, OTR    Pain Assessment    Pain Assessment No/denies pain  -AR Mickey PEREZ   -EH,LB,EH2 0-10  -ST    Pain Score  3  -EH,LB,EH2 3  -ST    Post Pain Score  3  -EH,LB,EH2 3  -ST    Pain Type  Surgical pain  -EH,LB,EH2 Surgical pain  -ST    Pain Location  Knee  -EH,LB,EH2 Knee  -ST    Pain Orientation  Left  -EH,LB,EH2 Left  -ST    Pain Intervention(s)  Repositioned;Ambulation/increased activity  -EH,LB,EH2 Repositioned;Ambulation/increased activity  -ST    Response to Interventions  Tolerated  -EH,LB,EH2     Recorded by [AR] Saadia Pinedo, OT [EH,LB,EH2] Dejah Chacko, PT (r) Sowmya Felix, PT Student (t) Dejah Chacko, PT (c) [ST] Leonora Tyson, OTR    Cognitive Assessment/Intervention    Current Cognitive/Communication Assessment impaired  -AR impaired  -EH,LB,EH2 functional  -ST    Orientation Status oriented to;person;place;situation   pt aware of her confusion  -AR oriented to;person;situation  -EH,LB,EH2 oriented to;person  -ST    Follows Commands/Answers Questions 75% of the time;able to follow single-step instructions;needs cueing;other (see comments)   impaired attention  -AR 75% of the time;able to follow single-step instructions;needs cueing;needs repetition  -EH,LB,EH2 50% of the time;needs cueing;needs increased time   extra cuing for safety  -ST    Personal Safety moderate impairment;impulsive;unaware of functional deficits  -AR mild impairment;impulsive  -EH,LB,EH2 moderate impairment  -ST    Personal Safety Interventions fall prevention program maintained  -AR gait belt;fall prevention program maintained;elopement precautions initiated;nonskid shoes/slippers when out of bed  -EH,LB,EH2 elopement precautions initiated;fall prevention program maintained;gait belt;nonskid shoes/slippers when out of bed  -ST    Additional Documentation  --   Pt. speaks incoherently at times  -EH,LB,EH2     Recorded by [AR] Saadia Pinedo, OT [EH,LB,EH2] Dejah Chacko, PT (r) Sowmya Felix, PT Student (t) Dejah Chacko, PT (c) [ST] Leonora Tyson, OTR    Mobility  Assessment/Training    Extremity Weight-Bearing Status left lower extremity  -AR      Left Lower Extremity Weight-Bearing weight-bearing as tolerated  -AR      Recorded by [AR] Saadia Pinedo, OT      Bed Mobility, Assessment/Treatment    Bed Mobility, Assistive Device bed rails;head of bed elevated  -AR  head of bed elevated  -ST    Bed Mob, Supine to Sit, [place] conditional independence  -AR  conditional independence  -ST    Bed Mob, Sit to Supine, [place]   not tested  -ST    Bed Mobility, Safety Issues   decreased use of arms for pushing/pulling;decreased use of legs for bridging/pushing  -ST    Bed Mobility, Impairments strength decreased  -AR  strength decreased  -ST    Bed Mobility, Comment pt declined needing leg   -AR defered   Pt. UIC upon PT arrival  -EH,LB,EH2 increased time to complete task, trouble motor planning  -ST    Recorded by [AR] Saadia Pinedo, OT [EH,LB,EH2] Dejah Chacko, PT (r) Sowmya Felix, PT Student (t) Dejah Chacko, PT (c) [ST] Leonora Tyson, OTR    Transfer Assessment/Treatment    Transfers, Bed-Chair [place] contact guard assist  -AR contact guard assist  -EH,LB,EH2     Transfers, Bed-Chair-Bed, Assist Device elevated surface;rolling walker  -AR rolling walker  -EH,LB,EH2     Transfers, Sit-Stand [place] verbal cues required;contact guard assist  -AR contact guard assist;verbal cues required  -EH,LB,EH2 contact guard assist  -ST    Transfers, Stand-Sit [place] verbal cues required;contact guard assist  -AR contact guard assist;verbal cues required  -EH,LB,EH2 contact guard assist  -ST    Transfers, Sit-Stand-Sit, Assist Device  rolling walker  -EH,LB,EH2 rolling walker  -ST    Toilet Transfer, [place]  minimum assist (75% patient effort);verbal cues required   Pt. had a bout of diarrhea at beginning of PT session  -EH,LB,EH2 minimum assist (75% patient effort)  -ST    Toilet Transfer, Assistive Device  rolling walker   Pt.  doesn't reach back or lower slowly despite education   -EH,LB,EH2 rolling walker  -ST    Transfer, Safety Issues  impulsivity;balance decreased during turns;sequencing ability decreased  -EH,LB,EH2     Transfer, Impairments sensation decreased;strength decreased  -AR strength decreased;pain  -EH,LB,EH2     Transfer, Comment Pt impuslive and attempting to stand prior to application of gait belt. Pt with poor safety, attempting to stand without walker. Pt requires cues for safe chair approach and to advance LLE during stand-to-sit transition.   -AR Repetition of multiple VCs required for hand placement, posture, and safety measures   Pt. demonstrated impulsiveness when performing sit-stand  -EH,LB,EH2 vc'ing for hand placement and transfer technique to improve overall safety  -ST    Recorded by [AR] Saadia Pinedo, OT [EH,LB,EH2] Dejah Chacko, PT (r) Sowmya Felix, PT Student (t) Dejah Chacko, PT (c) [ST] Leonora Tyson, RENAYR    Gait Assessment/Treatment    Gait, [place] Level  contact guard assist;verbal cues required  -EH,LB,EH2     Gait, Assistive Device  rolling walker  -EH,LB,EH2     Gait, Distance (Feet)  140  -EH,LB,EH2     Gait, Gait Pattern Analysis  swing-to gait   Pt. alternated b/t swing-to and swing-through  -EH,LB,EH2     Gait, Gait Deviations  left:;antalgic;lola decreased;decreased heel strike;right:;step length decreased;bilateral:;double stance time increased;forward flexed posture;limb motion velocity decreased  -EH,LB,EH2     Gait, Safety Issues  balance decreased during turns;step length decreased;sequencing ability decreased;weight-shifting ability decreased  -EH,LB,EH2     Gait, Impairments  strength decreased;pain;impaired balance  -EH,LB,EH2     Gait, Comment  VC required for sequencing, taking longer step lengths on RLE, posture, guiding the walker, pushing more weight through UEs  -EH,LB,EH2     Recorded by  [EH,LB,EH2] Dejah Chacko, PT (r) Sowmya Felix, PT  Student (t) Dejah Chacko, PT (c)     Functional Mobility    Functional Mobility- [place] Level verbal cues required;contact guard assist  -AR  contact guard assist  -ST    Functional Mobility- Device rolling walker  -AR  rolling walker  -ST    Functional Mobility-Distance (Feet) 5  -AR  15  -ST    Functional Mobility- Safety Issues weight-shifting ability decreased;balance decreased during turns  -AR  balance decreased during turns;sequencing ability decreased;step length decreased;weight-shifting ability decreased  -ST    Functional Mobility- Comment pt attempting to ambulate while holding cell phone, taking hand off walker and overall poor attention to safety and cues to follow safety technqiues to ensure safe transfer.   -AR  pt letting go of [facility]er [facility]o being in safe position, requiring max cuing for technique  -ST    Recorded by [AR] Saadia Pinedo, OT  [ST] Leonora Tyson, OTR    Lower Body Bathing Assessment/Training    LB Bathing Assess/Train, Comment Pt states she has LH sponge and declined need for replacement  -AR      Recorded by [AR] Saadia Pinedo, OT      Upper Body Dressing Assessment/Training    UB Dressing Assess/Train, Clothing Type donning:   robe  -AR      UB Dressing Assess/Train, Position edge of bed  -AR      UB Dressing Assess/Train, [place] supervision required  -AR      UB Dressing Assess/Train, Comment pt anticiaptes DC to SNF for rehab, she declined offer from OT to assist her with getting dressed.   -AR      Recorded by [AR] Saadia Pinedo OT      Lower Body Dressing Assessment/Training    LB Dressing Assess/Train, Clothing Type donning:;slipper socks  -AR      LB Dressing Assess/Train, Position edge of bed  -AR      LB Dressing Assess/Train, [place] moderate assist (50% patient effort)  -AR      LB Dressing Assess/Train, Impairments ROM decreased;pain  -AR      LB Dressing Assess/Train, Comment Pt declined AE to assist with LB  "dressing, stating she has reacher and sock aide and does not need or use them. Educated her on south-dressing.   -AR      Recorded by [AR] Saadia Pinedo, OT      Toileting Assessment/Training    Toileting Assess/Train, Assistive Device   bedside commode  -ST    Toileting Assess/Train, Position   sitting  -ST    Toileting Assess/Train, [place] Level   contact guard assist   for balance  -ST    Recorded by   [ST] Leonora Tyson, OTR    Therapy Exercises    Exercise Protocols  total knee  -EH,LB,EH2     Total Knee Exercises  left:;10 reps;completed protocol   Multiple VCs required; doesn't hold quad set; demo hip abd  -EH,LB,EH2     Recorded by  [EH,LB,EH2] Dejah Chacko, PT (r) Sowmya Felix, PT Student (t) Dejah Chacko, PT (c)     Positioning and Restraints    Pre-Treatment Position in bed  -AR sitting in chair/recliner  -EH,LB,EH2 in bed  -ST    Post Treatment Position chair  -AR chair  -EH,LB,EH2 chair  -ST    In Chair notified nsg;reclined;call light within reach;encouraged to call for assist;exit alarm on  -AR notified nsg;reclined;call light within reach;encouraged to call for assist;exit alarm on  -EH,LB,EH2 notified nsg;reclined;call light within reach;encouraged to call for assist;exit alarm on  -ST    Recorded by [AR] Saadia Pinedo, OT [EH,LB,EH2] Dejah Chacko, PT (r) Sowmya Felix, PT Student (t) Dejah Chacko, PT (c) [ST] Leonora Tyson, OTR      02/20/17 1040 02/19/17 1410 02/19/17 0813    Rehab Assessment/Intervention    Discipline physical therapist  -ANNALEE,LB,EH2 physical therapist  -ELSA physical therapist  -ELSA    Document Type therapy note (daily note)  -EH,LB,EH2 therapy note (daily note)  -ELSA therapy note (daily note)  -ELSA    Subjective Information agree to therapy;no complaints  -EH,LB,EH2 agree to therapy;no complaints   \"I feel better\"  -ELSA agree to therapy;complains of;pain;nausea/vomiting  -JM    Patient Effort, Rehab Treatment good  -EH,LB,EH2 good  -JM good  -JM "    Precautions/Limitations fall precautions   nerve catheter; pt. can be impulsive at times  -EH,LB,EH2 fall precautions   KI when up, nerve cath  -JM fall precautions   KI when up, nerve block catheter, impulsive  -JM    Recorded by [EH,LB,EH2] Dejah Chacko, PT (r) Sowmya Felix, PT Student (t) Dejah Chacko, PT (c) [JM] Pedro Jorge, PT [JM] Pedro Jorge, PT    Pain Assessment    Pain Assessment [place] FACES  -EH,LB,EH2 0-10  -JM 0-10  -JM    Pain Score 2  -EH,LB,EH2 4  -JM 4  -JM    Post Pain Score 4  -EH,LB,EH2 4  -JM 5  -JM    Pain Type Surgical pain  -EH,LB,EH2 Surgical pain  -JM Surgical pain  -JM    Pain Location Knee  -EH,LB,EH2 Knee  -JM Knee  -JM    Pain Orientation Left  -EH,LB,EH2 Left  -JM Left  -JM    Pain Intervention(s) Ambulation/increased activity;Repositioned  -EH,LB,EH2 Medication (See MAR)  -JM Medication (See MAR);Repositioned  -JM    Response to Interventions Tolerated  -EH,LB,EH2 Tolerated  -JM Tolerated  -JM    Recorded by [EH,LB,EH2] Dejah Chacko, PT (r) Sowmya Felix, PT Student (t) Dejah Chacko, PT (c) [JM] Pedro Jorge, PT [JM] Pedro Jorge, PT    Cognitive Assessment/Intervention    Current Cognitive/Communication Assessment impaired   Pt. was hard of hearing and showed some signs of confusion  -EH,LB,EH2 functional  -JM impaired  -JM    Orientation Status oriented to;person   Pt. did not realize she was in [facility]  -EH,LB,EH2 oriented x 4  -JM oriented x 4  -JM    Follows Commands/Answers Questions 75% of the time;able to follow single-step instructions;needs cueing;needs repetition  -EH,LB,EH2 100% of the time  -JM 75% of the time  -JM    Personal Safety mild impairment  -EH,LB,EH2 WNL/WFL  -JM mild impairment  -JM    Personal Safety Interventions gait belt;elopement precautions initiated;fall prevention program maintained;nonskid shoes/slippers when out of bed  -EH,LB,EH2 fall prevention program maintained;gait belt;muscle strengthening facilitated;nonskid  shoes/slippers when out of bed  - fall prevention program maintained;gait belt;muscle strengthening facilitated;nonskid shoes/slippers when out of bed  -    Additional Documentation --   Pt. thought she was going for X-rays when she was having PT  -EH,LB,EH2      Recorded by [EH,LB,EH2] Dejah Chacko, PT (r) Sowmya Felix, PT Student (t) Dejah Chacko, PT (c) [] Pedro Jorge, PT [JM] Pedro Jorge, PT    ROM (Range of Motion)    General ROM lower extremity range of motion deficits identified  -EH,LB,EH2      General ROM Detail 70 AROM L knee flexion, lacking 3 L knee extension  -EH,LB,EH2  -1 PROM knee extension; 0 AROM knee extension; 98 AROM knee flexion  -    Recorded by [EH,LB,EH2] Dejah Chacko, PT (r) Sowmya Felix, PT Student (t) Dejah Chacko, PT (c)  [JM] Pedro Jorge, PT    Bed Mobility, Assessment/Treatment    Bed Mobility, Assistive Device head of bed elevated;bed rails  -EH,LB,EH2 head of bed elevated  - head of bed elevated  -    Bed Mobility, Roll Left, [place] conditional independence  -EH,LB,EH2      Bed Mob, Supine to Sit, [place] conditional independence  -EH,LB,EH2 supervision required  - minimum assist (75% patient effort)  -    Bed Mob, Sit to Supine, [place] conditional independence  -EH,LB,EH2      Bed Mobility, Safety Issues decreased use of legs for bridging/pushing  -EH,LB,EH2      Bed Mobility, Impairments strength decreased;pain  -EH,LB,EH2  strength decreased   Pt more weak getting up for first time this AM  -    Bed Mobility, Comment pt. performed all bed mobility w/o use of a leg   -EH,LB,EH2      Recorded by [EH,LB,EH2] Dejah Chacko, PT (r) Sowmya Felix, PT Student (t) Dejah Chacko, PT (c) [JM] Pedro Jorge, PT [JM] Pedro Jorge, PT    Transfer Assessment/Treatment    Transfers, Bed-Chair [place] contact guard assist  -EH,LB,EH2  contact guard assist  -    Transfers, Bed-Chair-Bed, Assist Device  rolling walker  -EH,LB,EH2  rolling walker  -JM    Transfers, Sit-Stand [place] contact guard assist;verbal cues required  -EH,LB,EH2 contact guard assist   Improved safety awareness  -JM contact guard assist;verbal cues required  -JM    Transfers, Stand-Sit [place] contact guard assist;verbal cues required  -EH,LB,EH2 contact guard assist;verbal cues required   VC to slide LLE forward when sitting  -JM contact guard assist;verbal cues required  -JM    Transfers, Sit-Stand-Sit, Assist Device rolling walker  -EH,LB,EH2 rolling walker  -JM rolling walker  -JM    Toilet Transfer, [place] minimum assist (75% patient effort);verbal cues required  -EH,LB,EH2  minimum assist (75% patient effort)  -JM    Toilet Transfer, Assistive Device rolling walker  -EH,LB,EH2  rolling walker  -JM    Transfer, Safety Issues impulsivity;balance decreased during turns;sequencing ability decreased  -EH,LB,EH2      Transfer, Impairments strength decreased;pain  -EH,LB,EH2      Transfer, Comment VC required for hand placement and safety precautions   Pt. demonstrated impulsivity at times  -EH,LB,EH2      Recorded by [EH,LB,EH2] Dejah Chacko, PT (r) Sowmya Felix, PT Student (t) Dejah Chacko, PT (c) [JM] Pedro Jorge, PT [] Pedro Jorge, PT    Gait Assessment/Treatment    Gait, [place] Level contact guard assist;verbal cues required  -EH,LB,EH2 contact guard assist  - contact guard assist  -    Gait, Assistive Device rolling walker  -EH,LB,EH2 rolling walker  -JM rolling walker  -JM    Gait, Distance (Feet) 100  -EH,LB,EH2 60  -JM 30  -JM    Gait, Gait Pattern Analysis swing-to gait   gradually progressed to swing-through (RLE lagged behind)  -EH,LB,EH2 swing-to gait  - swing-to gait  -    Gait, Gait Deviations left:;antalgic;lola decreased;decreased heel strike;right:;step length decreased;bilateral:;double stance time increased;forward flexed posture;limb motion velocity decreased  -EH,LB,EH2  antalgic;decreased heel strike;forward flexed posture;limb motion velocity decreased;step length decreased  -JM antalgic;lola decreased;decreased heel strike  -    Gait, Safety Issues balance decreased during turns;sequencing ability decreased;step length decreased;weight-shifting ability decreased  -EH,LB,EH2      Gait, Impairments strength decreased;pain;impaired balance  -EH,LB,EH2      Gait, Comment VC required for sequencing, posture, guidance of the walker (pt. had tendency to hug the wall  -EH,LB,EH2 --   Gait distance limited by bowel urgency  - Gait distance limited by nausea today  -JM    Recorded by [EH,LB,EH2] Dejah Chacko, PT (r) Sowmya Felix, PT Student (t) Dejah Chacko, PT (c) [JM] Pedro Jorge, PT [JM] Pedro Jorge, PT    Therapy Exercises    Exercise Protocols total knee  -EH,LB,EH2  total knee  -    Total Knee Exercises left:;10 reps;completed protocol   Continuous VC, demonstration, and repetition required  -EH,LB,EH2  left:;15 reps;completed protocol;with assist;ankle pumps/circles;quad set;heel slides;SLR;SAQ  -JM    Recorded by [EH,LB,EH2] Dejah Chacko, PT (r) Sowmya Felix, PT Student (t) Dejah Chacko, PT (c)  [JM] Pedro Jorge, PT    Positioning and Restraints    Pre-Treatment Position in bed  -EH,LB,EH2 in bed  - in bed  -    Post Treatment Position bed  -EH,LB,EH2 [facility]  - chair  -    In Bed notified nsg;supine;call light within reach;encouraged to call for assist;exit alarm on  -EH,LB,EH2      In Chair   notified nsg;reclined;call light within reach;encouraged to call for assist;exit alarm on;legs elevated   nurse removed JAYSHREE hose  -    On  commode  notified nsg;sitting;call light within reach;encouraged to call for assist;L knee immobilizer   PCT notified; call bell in hand  -     Recorded by [EH,LB,EH2] Dejah Chacko, PT (r) Sowmya Felix, PT Student (t) Dejah Chacko, PT (c) [JM] Pedro Jorge, PT [JM] Pedro Jorge, PT       02/18/17 1503          Rehab Assessment/Intervention    Discipline physical therapist  -      Document Type therapy note (daily note)  -      Subjective Information agree to therapy;no complaints  -      Patient Effort, Rehab Treatment good  -      Precautions/Limitations fall precautions   Nerve block catheter; KI when up; impulsive  -      Recorded by [JM] Pedro Jorge, PT      Pain Assessment    Pain Assessment 0-10  -      Pain Score 2  -      Post Pain Score 2  -      Pain Type Surgical pain  -      Pain Location Knee  -      Pain Orientation Left  -      Recorded by [JM] Pedro Jorge, PT      Cognitive Assessment/Intervention    Current Cognitive/Communication Assessment impaired  -      Orientation Status oriented x 4  -      Follows Commands/Answers Questions 50% of the time  -      Personal Safety moderate impairment;decreased awareness, need for assist;decreased awareness, need for safety;decreased insight to deficits  -      Personal Safety Interventions fall prevention program maintained;gait belt;muscle strengthening facilitated;nonskid shoes/slippers when out of bed  -      Recorded by [JM] Pedro Jorge, PT      Bed Mobility, Assessment/Treatment    Bed Mobility, Assistive Device head of bed elevated  -      Bed Mob, Supine to Sit, [place] contact guard assist  -      Recorded by [JM] Pedro Jorge, PT      Transfer Assessment/Treatment    Transfers, Sit-Stand [place] minimum assist (75% patient effort);verbal cues required  -      Transfers, Sit-Stand-Sit, Assist Device rolling walker  -      Transfer, Comment --   Impulsive, VCs for hand placement, and safe technique  -      Recorded by [JM] Pedro Jorge, PT      Gait Assessment/Treatment    Gait, [place] Level contact guard assist;verbal cues required  -      Gait, Assistive Device rolling walker  -      Gait, Distance (Feet) 45  -      Gait, Gait Pattern Analysis swing-to gait  -       Gait, Gait Deviations forward flexed posture;antalgic;limb motion velocity decreased   Shuffling to advance feet  -JM      Recorded by [JM] Pedro Jorge, PT      Therapy Exercises    Exercise Protocols total knee  -JM      Total Knee Exercises left:;10 reps;completed protocol;with assist;ankle pumps/circles;quad set;glut set;heel slides;SLR;SAQ;hip abduction/adduction  -JM      Recorded by [JM] Pedro Jorge, PT      Positioning and Restraints    Pre-Treatment Position in bed  -JM      Post Treatment Position chair  -JM      In Chair notified nsg;reclined;call light within reach;encouraged to call for assist;exit alarm on;LLE elevated  -JM      Recorded by [JM] Pedro Jorge, PT        User Key  (r) = Recorded By, (t) = Taken By, (c) = Cosigned By    Initials Name Effective Dates     Dejah Chacko, PT 06/19/15 -     ST Leonora Tyson, OTR 02/20/17 -     ELSA Jorge, PT 06/19/15 -     AR Saadia Pinedo, OT 06/22/15 -     LB Sowmya Felix, PT Student 03/23/16 -                 OT Goals       02/21/17 0952 02/20/17 1133 02/18/17 0913    Bed Mobility OT LTG    Bed Mobility OT LTG, Date Established   02/18/17  -AC    Bed Mobility OT LTG, Time to Achieve   1 wk  -AC    Bed Mobility OT LTG, Activity Type   all bed mobility  -AC    Bed Mobility OT LTG, [place] Level   supervision required  -AC    Bed Mobility OT LTG, Outcome goal met  -AR goal partially met   progressing with supine/sit  -ST     Safety Awareness OT STG    Safety Awareness OT STG, Date Established   02/18/17  -AC    Safety Awareness OT STG, Time to Achieve   by discharge  -AC    Safety Awareness OT STG, Activity Type   good safety awareness;with ADL's  -AC    Safety Awareness OT STG, Outcome goal not met  -AR goal ongoing  -ST     Safety Awareness OT STG, Reason Goal Not Met discharged from facility  -AR      LB Dressing OT LTG    LB Dressing Goal OT LTG, Date Established   02/18/17  -AC    LB Dressing Goal OT LTG, Time to Achieve    by discharge  -AC    LB Dressing Goal OT LTG, [place] Level   moderate assist (50% patient effort)  -AC    LB Dressing Goal OT LTG, Adaptive Equipment   --   AE as appropriate  -AC    LB Dressing Goal OT LTG, Outcome goal met  -AR goal ongoing  -ST     Functional Mobility OT LTG    Functional Mobility Goal OT LTG, Date Established   02/18/17  -AC    Functional Mobility Goal OT LTG, Time to Achieve   by discharge  -AC    Functional Mobility Goal OT LTG, [place] Level   contact guard  -AC    Functional Mobility Goal OT LTG, Assist Device   rolling walker  -AC    Functional Mobility Goal OT LTG, Distance to Achieve   to the bathroom;in hallway  -AC    Functional Mobility Goal OT LTG, Outcome  goal met   met assist level, however unsafe  -ST       User Key  (r) = Recorded By, (t) = Taken By, (c) = Cosigned By    Initials Name Provider Type    AC Gely Adams, OT Occupational Therapist    ST Leonora Tyson, OTR Occupational Therapist    AR Saadia Pinedo, OT Occupational Therapist          Occupational Therapy Education     Title: PT OT SLP Therapies (Active)     Topic: Occupational Therapy (Active)     Point: ADL training (Active)    Description: Instruct learner(s) on proper safety adaptation and remediation techniques during self care or transfers.   Instruct in proper use of assistive devices.    Learning Progress Summary    Learner Readiness Method Response Comment Documented by Status   Patient Acceptance E,D,TB NR Reviewed ADL retraining with emphasis on south-dressing and AE- pt declined any AE, reviewed bed mobility, transfer training, safety awareness AR 02/21/17 0951 Active    Acceptance E,D NR significant reinforcement for safety, nathen. with transfers; poor safety awareness demonstrated this date ST 02/20/17 1445 Active    Acceptance E NR Role of OT, safety with BSC transfer and with use of walker AC 02/18/17 0911 Active               Point: Precautions (Active)    Description: Instruct  learner(s) on prescribed precautions during self-care and functional transfers.    Learning Progress Summary    Learner Readiness Method Response Comment Documented by Status   Patient Acceptance E,D,TB NR Reviewed ADL retraining with emphasis on south-dressing and AE- pt declined any AE, reviewed bed mobility, transfer training, safety awareness AR 02/21/17 0951 Active    Acceptance E,D NR significant reinforcement for safety, nathen. with transfers; poor safety awareness demonstrated this date ST 02/20/17 1445 Active               Point: Body mechanics (Active)    Description: Instruct learner(s) on proper positioning and spine alignment during self-care, functional mobility activities and/or exercises.    Learning Progress Summary    Learner Readiness Method Response Comment Documented by Status   Patient Acceptance E,D,TB NR Reviewed ADL retraining with emphasis on south-dressing and AE- pt declined any AE, reviewed bed mobility, transfer training, safety awareness AR 02/21/17 0951 Active                      User Key     Initials Effective Dates Name Provider Type Discipline     06/23/15 -  Gely Adams, OT Occupational Therapist OT    ST 02/20/17 -  Leonora Tyson, OTR Occupational Therapist OT    AR 06/22/15 -  Saadia Pinedo, OT Occupational Therapist OT                OT Recommendation and Plan  Anticipated Equipment Needs At Discharge:  (will further assess if pt appropriate to use AE for LBB/LBD)  Anticipated Discharge Disposition: skilled nursing facility  Planned Therapy Interventions: adaptive equipment training, ADL retraining, balance training, bed mobility training, transfer training  Therapy Frequency: daily  Plan of Care Review  Plan Of Care Reviewed With: patient  Progress: progress towards functional goals is fair  Outcome Summary/Follow up Plan: Pt achieved ADL goal. Pt continues to be limited with confusion and impulsiveness. No c/o pain. Pt anticipates DC to SNF-level rehab, recommend  continued skilled OT intervention.           Outcome Measures       02/21/17 0913 02/20/17 1415 02/20/17 1133    How much help from another person do you currently need...    Turning from your back to your side while in flat bed without using bedrails?  4  -EH (r) LB (t) EH (c)     Moving from lying on back to sitting on the side of a flat bed without bedrails?  4  -EH (r) LB (t) EH (c)     Moving to and from a bed to a chair (including a wheelchair)?  3  -EH (r) LB (t) EH (c)     Standing up from a chair using your arms (e.g., wheelchair, bedside chair)?  3  -EH (r) LB (t) EH (c)     Climbing 3-5 steps with a railing?  2  -EH (r) LB (t) EH (c)     To walk in hospital room?  3  -EH (r) LB (t) EH (c)     AM-PAC 6 Clicks Score  19  -EH (r) LB (t)     How much help from another is currently needed...    Putting on and taking off regular lower body clothing? 2  -AR  2  -ST    Bathing (including washing, rinsing, and drying) 2  -AR  2  -ST    Toileting (which includes using toilet bed pan or urinal) 3  -AR  3  -ST    Putting on and taking off regular upper body clothing 3  -AR  3  -ST    Taking care of personal grooming (such as brushing teeth) 3  -AR  3  -ST    Eating meals 4  -AR  4  -ST    Score 17  -AR  17  -ST    Functional Assessment    Outcome Measure Options AM-PAC 6 Clicks Daily Activity (OT)  -AR AM-PAC 6 Clicks Basic Mobility (PT)  -EH (r) LB (t) EH (c)       02/20/17 1040 02/19/17 1410 02/19/17 0813    How much help from another person do you currently need...    Turning from your back to your side while in flat bed without using bedrails? 4  -EH (r) LB (t) EH (c) 3  -JM 3  -JM    Moving from lying on back to sitting on the side of a flat bed without bedrails? 4  -EH (r) LB (t) EH (c) 3  -JM 3  -JM    Moving to and from a bed to a chair (including a wheelchair)? 3  -EH (r) LB (t) EH (c) 3  -JM 3  -JM    Standing up from a chair using your arms (e.g., wheelchair, bedside chair)? 3  -EH (r) LB (t) EH (c) 3  -JM  3  -JM    Climbing 3-5 steps with a railing? 2  -EH (r) LB (t) EH (c) 2  -JM 2  -JM    To walk in hospital room? 3  -EH (r) LB (t) EH (c) 3  -JM 3  -JM    AM-PAC 6 Clicks Score 19  -EH (r) LB (t) 17  -JM 17  -JM    Functional Assessment    Outcome Measure Options AM-PAC 6 Clicks Basic Mobility (PT)  -EH (r) LB (t) EH (c) AM-PAC 6 Clicks Basic Mobility (PT)  -JM AM-PAC 6 Clicks Basic Mobility (PT)  -      02/18/17 1503          How much help from another person do you currently need...    Turning from your back to your side while in flat bed without using bedrails? 3  -JM      Moving from lying on back to sitting on the side of a flat bed without bedrails? 3  -JM      Moving to and from a bed to a chair (including a wheelchair)? 3  -JM      Standing up from a chair using your arms (e.g., wheelchair, bedside chair)? 3  -JM      Climbing 3-5 steps with a railing? 2  -JM      To walk in hospital room? 3  -JM      AM-PAC 6 Clicks Score 17  -JM      Functional Assessment    Outcome Measure Options AM-PAC 6 Clicks Basic Mobility (PT)  -        User Key  (r) = Recorded By, (t) = Taken By, (c) = Cosigned By    Initials Name Provider Type     Dejah Chacko, PT Physical Therapist    ST Leonora Tyson, OTR Occupational Therapist    ELSA Jorge, PT Physical Therapist    REAL Pinedo, OT Occupational Therapist    MALIK Felix, PT Student PT Student           Time Calculation:          Time Calculation- OT       02/21/17 0959          Time Calculation- OT    OT Start Time 0913  -AR      Total Timed Code Minutes- OT 24 minute(s)  -AR      OT Received On 02/21/17  -AR      OT Goal Re-Cert Due Date 02/28/17  -AR        User Key  (r) = Recorded By, (t) = Taken By, (c) = Cosigned By    Initials Name Provider Type    REAL Pinedo, OT Occupational Therapist          Therapy Charges for Today     Code Description Service Date Service Provider Modifiers Qty    [id number]  OT THERAPEUTIC ACT EA 15  MIN 2/21/2017 Saadia Pinedo OT GO 2               OT Discharge Summary  Anticipated Discharge Disposition: skilled nursing facility  Reason for Discharge: Discharge from facility  Outcomes Achieved: Patient able to partially acheive established goals  Discharge Destination: SNF    Saadia Pinedo OT  2/21/2017

## 2017-02-21 NOTE — PLAN OF CARE
Problem: Patient Care Overview (Adult)  Goal: Plan of Care Review  Outcome: Unable to achieve outcome(s) by discharge Date Met:  02/21/17 02/21/17 1543   Coping/Psychosocial Response Interventions   Plan Of Care Reviewed With patient   Patient Care Overview   Progress improving   Outcome Evaluation   Outcome Summary/Follow up Plan Patient leaving for rehab. Still requires supervision with all mobility.          Problem: Inpatient Physical Therapy  Goal: Transfer Training Goal 1 LTG- PT  Outcome: Unable to achieve outcome(s) by discharge Date Met:  02/21/17 02/18/17 0917 02/19/17 1436 02/20/17 1531   Transfer Training PT LTG   Transfer Training PT LTG, Date Established --  --  02/20/17   Transfer Training PT LTG, Time to Achieve 5 days --  --    Transfer Training PT LTG, Activity Type bed to chair /chair to bed;sit to stand/stand to sit --  --    Transfer Training PT LTG, [place] Level supervision required --  --    Transfer Training PT LTG, Assist Device walker, rolling --  --    Transfer Training PT LTG, Date Goal Reviewed --  02/19/17 --    Transfer Training PT LTG, Outcome --  --  --      02/21/17 1543   Transfer Training PT LTG   Transfer Training PT LTG, Date Established --    Transfer Training PT LTG, Time to Achieve --    Transfer Training PT LTG, Activity Type --    Transfer Training PT LTG, [place] Level --    Transfer Training PT LTG, Assist Device --    Transfer Training PT LTG, Date Goal Reviewed --    Transfer Training PT LTG, Outcome goal not met       Goal: Gait Training Goal LTG- PT  Outcome: Unable to achieve outcome(s) by discharge Date Met:  02/21/17 02/18/17 0917 02/19/17 1436 02/20/17 1531   Gait Training PT LTG   Gait Training Goal PT LTG, Date Established --  --  02/20/17   Gait Training Goal PT LTG, Time to Achieve 5 days --  --    Gait Training Goal PT LTG, [place] Level supervision required --  --    Gait Training Goal PT LTG, Assist Device walker, rolling --  --     Gait Training Goal PT LTG, Distance to Achieve 250 --  --    Gait Training Goal PT LTG, Date Goal Reviewed --  02/19/17 --    Gait Training Goal PT LTG, Outcome --  --  --      02/21/17 1543   Gait Training PT LTG   Gait Training Goal PT LTG, Date Established --    Gait Training Goal PT LTG, Time to Achieve --    Gait Training Goal PT LTG, [place] Level --    Gait Training Goal PT LTG, Assist Device --    Gait Training Goal PT LTG, Distance to Achieve --    Gait Training Goal PT LTG, Date Goal Reviewed --    Gait Training Goal PT LTG, Outcome goal partially met       Goal: Range of Motion Goal LTG- PT  Outcome: Unable to achieve outcome(s) by discharge Date Met:  02/21/17 02/18/17 0917 02/19/17 1436 02/20/17 1531   Range of Motion PT LTG   Range of Motion Goal PT LTG, Date Established --  --  02/20/17   Range of Motion Goal PT LTG, Time to Achieve 5 days --  --    Range fo Motion Goal PT LTG, Joint L knee --  --    Range of Motion Goal PT LTG, AROM Measure 0-110 --  --    Range of Motion Goal PT LTG, Date Goal Reviewed --  02/19/17 --    Range of Motion Goal PT LTG, Outcome --  --  --      02/21/17 1543   Range of Motion PT LTG   Range of Motion Goal PT LTG, Date Established --    Range of Motion Goal PT LTG, Time to Achieve --    Range fo Motion Goal PT LTG, Joint --    Range of Motion Goal PT LTG, AROM Measure --    Range of Motion Goal PT LTG, Date Goal Reviewed --    Range of Motion Goal PT LTG, Outcome goal partially met       Goal: Patient Education Goal LTG- PT  Outcome: Unable to achieve outcome(s) by discharge Date Met:  02/21/17 02/18/17 0917 02/19/17 1436 02/20/17 1531   Patient Education PT LTG   Patient Education PT LTG, Date Established --  --  02/20/17   Patient Education PT LTG, Time to Achieve 5 days --  --    Patient Education PT LTG, Education Type HEP;precaution per surgeon;gait;transfers;bed mobility;benefits of activity;pain management --  --    Patient Education PT LTG, Education  Understanding demonstrate adequately;verbalize understanding --  --    Patient Education PT LTG, Date Goal Reviewed --  02/19/17 --    Patient Education PT LTG Outcome --  --  --    Patient Education PT LTG, Reason Goal Not Met --  --  --      02/21/17 1543   Patient Education PT LTG   Patient Education PT LTG, Date Established --    Patient Education PT LTG, Time to Achieve --    Patient Education PT LTG, Education Type --    Patient Education PT LTG, Education Understanding --    Patient Education PT LTG, Date Goal Reviewed --    Patient Education PT LTG Outcome goal ongoing   Patient Education PT LTG, Reason Goal Not Met discharged from facility

## 2017-02-21 NOTE — DISCHARGE SUMMARY
Patient Name: Radha Ruggiero  MRN: [id number]  : 7/3/1932  DOS: 2017    Attending: Simon Colvin MD    Primary Care Provider: Anuel Amador MD    Date of Admission:.2017  9:09 AM    Date of Discharge:  2017    Discharge Diagnosis: Principal Problem:    S/P revision of total replacement of left knee  Active Problems:    Loose total knee arthroplasty    A-fib    HTN (hypertension)    Prediabetes    Acute blood loss anemia, mild, asymptomatic      Hospital Course  Patient is a 84 y.o. female presented for revision of left total knee arthroplasty by Dr. Colvin under [place]. She tolerated surgery well and was admitted for further medical management.     Patient was provided pain medications as needed for pain control, along with adductor canal nerve block infusion of Ropivacaine.    She was seen by PT and OT and has progressed slowly over her stay.  She used an IS for atelectasis prophylaxis and Lovenox along with mechanicals for DVT prophylaxis.  Home medications were resumed as appropriate, and labs were monitored and remained fairly stable.   Her Hgb A1C was elevated on her pre-op labs. A diabetes educator provided her with diabetic education and a glucometer.    With the progress she has made, she is ready for DC to [facility] today.    A prineo dressing is in place and is to remain for 2 weeks.  Discussed with patient regarding plan and she shows understanding and agreement.        Procedures Performed  DATE OF PROCEDURE:  2017     PREOPERATIVE DIAGNOSIS: Failed left total knee arthroplasty, loose tibial component.     POSTOPERATIVE DIAGNOSIS: Failed left total knee arthroplasty, loose tibial component.     PROCEDURE PERFORMED: Revision of left total knee arthroplasty, both components.      SURGEON: Simon Colvin MD       Pertinent Test Results:    I reviewed the patient's new clinical results.     Results from last 7 days  Lab Units 17  0511 17  0521   WBC  "10*3/mm3  --  10.53   HEMOGLOBIN g/dL 9.6* 10.7*   HEMATOCRIT % 30.1* 32.9*   PLATELETS 10*3/mm3  --  185       Results from last 7 days  Lab Units 17  0521   SODIUM mmol/L 136   POTASSIUM mmol/L 4.2   CHLORIDE mmol/L 103   TOTAL CO2 mmol/L 29.0   BUN mg/dL 11   CREATININE mg/dL 0.70   CALCIUM mg/dL 8.7   GLUCOSE mg/dL 175*     Results for IRKY MCDANIELS (MRN [id number]) as of 2017 08:33   Ref. Range 2017 07:55 2017 12:12 2017 16:36 2017 20:59 2017 07:37   Glucose Latest Ref Range: 70 - 130 mg/dL 107 104 103 111 93       I reviewed the patient's new imaging including images and reports.      Physical therapy: Pt. increased ambulation distance to 140'. Continuous VCs and repetition still required throughout PT session regarding correct performance of exercises and safety precautions during t/f and gait.     Discharge Assessment:    Vital Signs  Visit Vitals   • /68 (BP Location: Right arm, Patient Position: Lying)   • Pulse 68   • Temp 98.4 °F (36.9 °C) (Tympanic)   • Resp 18   • Ht 62\" (157.5 cm)   • Wt 162 lb (73.5 kg)   • SpO2 98%   • BMI 29.63 kg/m2     Temp (24hrs), Av °F (36.7 °C), Min:97.6 °F (36.4 °C), Max:98.4 °F (36.9 °C)      General Appearance:    Alert, cooperative, in no acute distress   Lungs:     Clear to auscultation,respirations regular, even and                   unlabored    Heart:    Regular rhythm and normal rate, normal S1 and S2   Abdomen:     Normal bowel sounds, no masses, no organomegaly, soft        non-tender, non-distended, no guarding, no rebound                 tenderness   Extremities:   Left knee Prineo CDI. Nerve block present (discontinued prior to discharge)   Pulses:   Pulses palpable and equal bilaterally   Skin:   No bleeding, bruising or rash   Neurologic:   Cranial nerves 2 - 12 grossly intact, sensation intact. Flexion and dorsiflexion intact bilateral feet.       Discharge Disposition: [facility]     Discharge Medications   " Radha Ruggiero   Home Medication Instructions JOSE:[id number]    Printed on:02/21/17 7274   Medication Information                      amiodarone (PACERONE) 200 MG tablet  Take 100 mg by mouth Daily. With largest meal of the day             apixaban (ELIQUIS) 5 MG tablet tablet  Take 1 tablet by mouth 2 (Two) Times a Day. Resume on follow up with Dr. Colvin             atorvastatin (LIPITOR) 40 MG tablet  Take 40 mg by mouth Daily.             brimonidine (ALPHAGAN) 0.15 % ophthalmic solution  Administer 1 drop to both eyes 3 (Three) Times a Day.             cholecalciferol (VITAMIN D3) 400 UNITS tablet  Take 400 Units by mouth Daily.             docusate sodium (COLACE) 100 MG capsule  Take 1 capsule by mouth 2 (Two) Times a Day.             enoxaparin (LOVENOX) 40 MG/0.4ML solution syringe  Inject 0.4 mL under the skin Daily. Until follow up with Dr. Colvin             HYDROcodone-acetaminophen (NORCO)  MG per tablet  Take 1 tablet by mouth 2 (Two) Times a Day As Needed for moderate pain (4-6).             HYDROcodone-acetaminophen (NORCO) 7.5-325 MG per tablet  Take 1 tablet by mouth Every 4 (Four) Hours As Needed for moderate pain (4-6) for up to 6 days.             levothyroxine sodium (TIROSINT) 112 MCG capsule  Take 112 mcg by mouth Daily.             nystatin (MYCOSTATIN) 760939 UNIT/GM cream  Apply 1 application topically 3 (Three) Times a Day.             omeprazole (priLOSEC) 20 MG capsule  Take 20 mg by mouth As Needed.             polyethylene glycol pack  Take  by mouth Daily. 2 teaspoon             sertraline (ZOLOFT) 50 MG tablet  Take 50 mg by mouth Daily.             tolterodine (DETROL) 2 MG tablet  Take 2 mg by mouth Daily.             Wheat Dextrin (EQ FIBER POWDER PO)  Take  by mouth 2 (Two) Times a Day. 2 teaspoons                 Discharge Diet: Consistent carb diet    Activity at Discharge: WBAT LLE    Follow-up Appointments  Dr. Colvin per his orders  Discharge took over  30 min       HADLEY Tate  02/21/17  11:16 AM

## 2017-02-21 NOTE — PLAN OF CARE
Problem: Patient Care Overview (Adult)  Goal: Plan of Care Review  Outcome: Ongoing (interventions implemented as appropriate)    02/21/17 0952   Coping/Psychosocial Response Interventions   Plan Of Care Reviewed With patient   Patient Care Overview   Progress progress towards functional goals is fair   Outcome Evaluation   Outcome Summary/Follow up Plan Pt achieved ADL goal. Pt continues to be limited with confusion and impulsiveness. No c/o pain. Pt anticipates DC to SNF-level rehab, recommend continued skilled OT intervention.          Problem: Inpatient Occupational Therapy  Goal: Bed Mobility Goal LTG- OT  Outcome: Outcome(s) achieved Date Met:  02/21/17 02/18/17 0913 02/21/17 0952   Bed Mobility OT LTG   Bed Mobility OT LTG, Date Established 02/18/17 --    Bed Mobility OT LTG, Time to Achieve 1 wk --    Bed Mobility OT LTG, Activity Type all bed mobility --    Bed Mobility OT LTG, [place] Level supervision required --    Bed Mobility OT LTG, Outcome --  goal met       Goal: Safety Awareness Goal STG- OT  Outcome: Unable to achieve outcome(s) by discharge Date Met:  02/21/17 02/18/17 0913 02/21/17 0952   Safety Awareness OT STG   Safety Awareness OT STG, Date Established 02/18/17 --    Safety Awareness OT STG, Time to Achieve by discharge --    Safety Awareness OT STG, Activity Type good safety awareness;with ADL's --    Safety Awareness OT STG, Outcome --  goal not met   Safety Awareness OT STG, Reason Goal Not Met --  discharged from facility       Goal: LB Dressing Goal LTG- OT  Outcome: Outcome(s) achieved Date Met:  02/21/17 02/18/17 0913 02/21/17 0952   LB Dressing OT LTG   LB Dressing Goal OT LTG, Date Established 02/18/17 --    LB Dressing Goal OT LTG, Time to Achieve by discharge --    LB Dressing Goal OT LTG, [place] Level moderate assist (50% patient effort) --    LB Dressing Goal OT LTG, Adaptive Equipment (AE as appropriate) --    LB Dressing Goal OT LTG, Outcome --  goal met          Problem: Knee Replacement, Total (Adult)  Goal: Signs and Symptoms of Listed Potential Problems Will be Absent or Manageable (Knee Replacement, Total)  Outcome: Ongoing (interventions implemented as appropriate)    02/21/17 0952   Knee Replacement, Total   Problems Assessed (Total Knee Replacement) functional decline/self care deficit   Problems Present (Total Knee Replacement) functional decline/self care deficit

## 2017-02-21 NOTE — PROGRESS NOTES
Continued Stay Note  [facility]     Patient Name: Radha Ruggiero  MRN: [id number]  Today's Date: 2/21/2017    Admit Date: 2/17/2017          Discharge Plan       02/21/17 0804    Case Management/Social Work Plan    Plan [facility]    Additional Comments [facility]. Ruggiero [facility] bed [facility] today, per Jamie at facility.  Notified Ms. Ruggiero yesterday and she said that her son in law will be transporting her to the facility today by car.  Notified Saadia COMBS.  Please call report to [facility] at ph [phone].  CM will fax transfer summary to fax [phone].  Please have copy of transfer summary and any scripts in the discharge packet.  Thank you.               Discharge Codes     None        Expected Discharge Date and Time     Expected Discharge Date Expected Discharge Time    Feb 21, 2017             Saloni Stapleton

## 2017-02-21 NOTE — PLAN OF CARE
Problem: Patient Care Overview (Adult)  Goal: Plan of Care Review  Outcome: Ongoing (interventions implemented as appropriate)    02/21/17 0348   Coping/Psychosocial Response Interventions   Plan Of Care Reviewed With patient   Patient Care Overview   Progress progress toward functional goals as expected   Outcome Evaluation   Outcome Summary/Follow up Plan Pt VSS. Minimal c/o pain, controlled with prn pain medication. Ropivicaine at 10. Pt will DC today. Rested through the night.          Problem: Perioperative Period (Adult)  Goal: Signs and Symptoms of Listed Potential Problems Will be Absent or Manageable (Perioperative Period)  Outcome: Ongoing (interventions implemented as appropriate)    Problem: Knee Replacement, Total (Adult)  Goal: Signs and Symptoms of Listed Potential Problems Will be Absent or Manageable (Knee Replacement, Total)  Outcome: Ongoing (interventions implemented as appropriate)

## 2017-03-03 LAB
BACTERIA SPEC AEROBE CULT: NORMAL
GRAM STN SPEC: NORMAL
GRAM STN SPEC: NORMAL

## 2017-06-13 ENCOUNTER — OFFICE VISIT (OUTPATIENT)
Dept: ORTHOPEDIC SURGERY | Facility: CLINIC | Age: 82
End: 2017-06-13

## 2017-06-13 VITALS
BODY MASS INDEX: 26.68 KG/M2 | WEIGHT: 145 LBS | DIASTOLIC BLOOD PRESSURE: 82 MMHG | SYSTOLIC BLOOD PRESSURE: 138 MMHG | HEIGHT: 62 IN | HEART RATE: 72 BPM

## 2017-06-13 DIAGNOSIS — Z09 S/P ORTHOPEDIC SURGERY, FOLLOW-UP EXAM: ICD-10-CM

## 2017-06-13 DIAGNOSIS — Z96.652 HISTORY OF LEFT KNEE REPLACEMENT: Primary | ICD-10-CM

## 2017-06-13 PROCEDURE — 99212 OFFICE O/P EST SF 10 MIN: CPT | Performed by: PHYSICIAN ASSISTANT

## 2017-06-13 NOTE — PROGRESS NOTES
Subjective     Follow-up of the Left Knee (2-17-17 Left TKA Revision)      Radha Ruggiero is a 84 y.o. female.     History of Present Illness patient returns today for follow-up of her revision left total knee February 17, 2017.  She is concerned about some continued aching pain with increased activity as well as feelings of altered gait.  She reports pain is significantly better than it was prior to surgery she did formal physical therapy and has been released.  She does not continue her therapy exercises at home.  She reports increased soreness with walking long distances.  She feels that the knee is slightly warmer than the right.  She denies any erythema fever or chills or effusion.  She denies any radiating pain.  She continues to use 2 canes for ambulation for multiple problems including her back.  She is here to make sure there is nothing more worrisome going on in her knee.    Allergies   Allergen Reactions   • Dolasetron Arrhythmia     anzemet     Life threatening high bp and rapid pulse     Current Outpatient Prescriptions on File Prior to Visit   Medication Sig Dispense Refill   • amiodarone (PACERONE) 200 MG tablet Take 100 mg by mouth Daily. With largest meal of the day     • apixaban (ELIQUIS) 5 MG tablet tablet Take 1 tablet by mouth 2 (Two) Times a Day. Resume on follow up with Dr. Colvin 60 tablet    • atorvastatin (LIPITOR) 40 MG tablet Take 40 mg by mouth Daily.     • brimonidine (ALPHAGAN) 0.15 % ophthalmic solution Administer 1 drop to both eyes 3 (Three) Times a Day.     • cholecalciferol (VITAMIN D3) 400 UNITS tablet Take 400 Units by mouth Daily.     • levothyroxine sodium (TIROSINT) 112 MCG capsule Take 112 mcg by mouth Daily.     • nystatin (MYCOSTATIN) 582452 UNIT/GM cream Apply 1 application topically 3 (Three) Times a Day.     • polyethylene glycol pack Take  by mouth Daily. 2 teaspoon     • sertraline (ZOLOFT) 50 MG tablet Take 50 mg by mouth Daily.     • tolterodine (DETROL) 2 MG  tablet Take 2 mg by mouth Daily.     • Wheat Dextrin (EQ FIBER POWDER PO) Take  by mouth 2 (Two) Times a Day. 2 teaspoons     • docusate sodium (COLACE) 100 MG capsule Take 1 capsule by mouth 2 (Two) Times a Day. 60 capsule 0   • enoxaparin (LOVENOX) 40 MG/0.4ML solution syringe Inject 0.4 mL under the skin Daily. Until follow up with Dr. Colvin 11.2 mL    • HYDROcodone-acetaminophen (NORCO)  MG per tablet Take 1 tablet by mouth 2 (Two) Times a Day As Needed for moderate pain (4-6). 41 tablet 0   • omeprazole (priLOSEC) 20 MG capsule Take 20 mg by mouth As Needed.       No current facility-administered medications on file prior to visit.      Social History     Social History   • Marital status:      Spouse name: N/A   • Number of children: N/A   • Years of education: N/A     Occupational History   • Not on file.     Social History Main Topics   • Smoking status: Never Smoker   • Smokeless tobacco: Never Used   • Alcohol use No   • Drug use: No   • Sexual activity: Defer     Other Topics Concern   • Not on file     Social History Narrative     Past Surgical History:   Procedure Laterality Date   • BREAST LUMPECTOMY      left   • EYE SURGERY     • FOOT SURGERY Bilateral     BUNIONECTOMY, HAMMER TOE   • JOINT REPLACEMENT Left 02/17/2017    Left TKA revision   • JOINT REPLACEMENT Right 04/2010    Right TKA   • [place] REVISE KNEE JOINT REPLACE,1 PART Left 2/17/2017    Procedure: REVISION LEFT TOTAL KNEE ARTHROPLASTY, BOTH COMPONENTS;  Surgeon: Simon Colvin MD;  Location: [facility];  Service: Orthopedics   • SHOULDER ARTHROSCOPY      X2   • CRUZ ARTHROPLASTY     • TOTAL KNEE ARTHROPLASTY Bilateral      Family History   Problem Relation Age of Onset   • Depression Mother    • Stroke Father    • Heart disease Father    • Diabetes Other      Past Medical History:   Diagnosis Date   • Arthritis    • Atrial fibrillation    • Back pain    • Blepharitis, both eyes    • Cancer     left breast   • Glaucoma   "   right eye   • Hypertension    • PONV (postoperative nausea and vomiting)     TARA WITH MORPHINE   • Wears glasses          Review of Systems   Constitutional: Positive for unexpected weight change.   HENT: Negative.    Eyes: Negative.    Respiratory: Negative.    Cardiovascular: Negative.    Gastrointestinal: Negative.    Endocrine: Negative.    Genitourinary: Negative.    Musculoskeletal: Positive for back pain and joint swelling.   Skin: Negative.    Allergic/Immunologic: Negative.    Neurological: Negative.    Hematological: Negative.    Psychiatric/Behavioral: Positive for confusion.       The following portions of the patient's history were reviewed and updated as appropriate: allergies, current medications, past family history, past medical history, past social history, past surgical history and problem list.    Objective   /82  Pulse 72  Ht 62\" (157.5 cm)  Wt 145 lb (65.8 kg)  BMI 26.52 kg/m2    Physical Exam:   GENERAL: Body habitus: normal weight for height    Lower extremity edema: Left: 1+ pitting; Right: 1+ pitting    Varicose veins:  Left: moderate; Right: moderate    Gait: using cane     Mental Status:  awake and alert; oriented to person, place, and time    Voice:  clear  SKIN:  Normal    Hair Growth:  Right:normal; Left:  normal  HEENT: Head: Normocephalic, no lesions, without obvious abnormality.     Eyes: sclera anicteric  PULM:  Repiratory effort normal    Ortho Exam      Medical Decision Making    Data Review:   none    Assessment and Plan/ Diagnosis/Treatment options:   Doing well status post revision left total knee arthroplasty February 2017 with leatha Colvin.  I reviewed clinical findings with the patient today on exam she is nontender with great range of motion, stable ligamentous exam.  She has no effusion or erythema.  There is some mild warmth compared to the right which I explained to her is still normal at this point in time.  There is no effusion of the knee today.  I " watched her ambulate and she has a steady gait with no apparent limp.  I reassured the patient that I think her knee is doing very well.  I don't see anything worrisome on exam.  She reports that she is much improved than she was since surgery and before.  Plan is continued observation.  She will return as scheduled in April 2018 for routine follow-up or sooner if needed.

## 2018-04-10 ENCOUNTER — OFFICE VISIT (OUTPATIENT)
Dept: ORTHOPEDIC SURGERY | Facility: CLINIC | Age: 83
End: 2018-04-10

## 2018-04-10 VITALS
WEIGHT: 146.61 LBS | BODY MASS INDEX: 25.98 KG/M2 | DIASTOLIC BLOOD PRESSURE: 62 MMHG | HEART RATE: 60 BPM | HEIGHT: 63 IN | SYSTOLIC BLOOD PRESSURE: 130 MMHG

## 2018-04-10 DIAGNOSIS — Z96.652 HISTORY OF TOTAL LEFT KNEE REPLACEMENT: Primary | ICD-10-CM

## 2018-04-10 PROCEDURE — 99213 OFFICE O/P EST LOW 20 MIN: CPT | Performed by: PHYSICIAN ASSISTANT

## 2018-04-10 NOTE — PROGRESS NOTES
I have reviewed the notes, assessments, and/or procedures performed by Larisa Parker PA-C, I concur with her documentation of Radha Ruggiero.

## 2018-04-10 NOTE — PROGRESS NOTES
[facility] [place] [facility] Note    Subjective     Patient: Radha Ruggiero  : 7/3/1932    Primary Care Provider: Anuel Amador MD    Requesting Provider: As above    Follow-up of the Left Knee (Revision (L) Total Knee Arthroplasty, Both Components 17 by MAN)      History    Chief Complaint: annual f/up L TKA    History of Present Illness: Patient presents for routine annual f/up Revision left total knee arthroplasty.  She reports she is doing very well.  She has no pain or problems from the knee.  She is using a cane for ambulation and sometimes a walker, but not secondary to the knee pain.  She's been happy with her revision.    Current Outpatient Prescriptions on File Prior to Visit   Medication Sig Dispense Refill   • amiodarone (PACERONE) 200 MG tablet Take 100 mg by mouth Daily. With largest meal of the day     • apixaban (ELIQUIS) 5 MG tablet tablet Take 1 tablet by mouth 2 (Two) Times a Day. Resume on follow up with Dr. Colvin 60 tablet    • atorvastatin (LIPITOR) 40 MG tablet Take 40 mg by mouth Daily.     • brimonidine (ALPHAGAN) 0.15 % ophthalmic solution Administer 1 drop to both eyes 3 (Three) Times a Day.     • cholecalciferol (VITAMIN D3) 400 UNITS tablet Take 400 Units by mouth Daily.     • docusate sodium (COLACE) 100 MG capsule Take 1 capsule by mouth 2 (Two) Times a Day. 60 capsule 0   • Donepezil HCl (ARICEPT ODT PO) Take  by mouth.     • enoxaparin (LOVENOX) 40 MG/0.4ML solution syringe Inject 0.4 mL under the skin Daily. Until follow up with Dr. Colvin 11.2 mL    • HYDROcodone-acetaminophen (NORCO)  MG per tablet Take 1 tablet by mouth 2 (Two) Times a Day As Needed for moderate pain (4-6). 41 tablet 0   • levothyroxine sodium (TIROSINT) 112 MCG capsule Take 112 mcg by mouth Daily.     • nystatin (MYCOSTATIN) 427505 UNIT/GM cream Apply 1 application topically 3 (Three) Times a Day.     • omeprazole (priLOSEC) 20 MG capsule Take 20 mg by mouth As Needed.      • polyethylene glycol pack Take  by mouth Daily. 2 teaspoon     • sertraline (ZOLOFT) 50 MG tablet Take 50 mg by mouth Daily.     • tolterodine (DETROL) 2 MG tablet Take 2 mg by mouth Daily.     • Wheat Dextrin (EQ FIBER POWDER PO) Take  by mouth 2 (Two) Times a Day. 2 teaspoons       No current facility-administered medications on file prior to visit.       Allergies   Allergen Reactions   • Dolasetron Arrhythmia     anzemet     Life threatening high bp and rapid pulse      Past Medical History:   Diagnosis Date   • Arthritis    • Atrial fibrillation    • Back pain    • Blepharitis, both eyes    • Cancer     left breast   • Glaucoma     right eye   • Hypertension    • PONV (postoperative nausea and vomiting)     TARA WITH MORPHINE   • Wears glasses      Past Surgical History:   Procedure Laterality Date   • BREAST LUMPECTOMY      left   • EYE SURGERY     • FOOT SURGERY Bilateral     BUNIONECTOMY, HAMMER TOE   • JOINT REPLACEMENT Left 02/17/2017    Left TKA revision   • JOINT REPLACEMENT Right 04/2010    Right TKA   • [place] REVISE KNEE JOINT REPLACE,1 PART Left 2/17/2017    Procedure: REVISION LEFT TOTAL KNEE ARTHROPLASTY, BOTH COMPONENTS;  Surgeon: Simon Colvin MD;  Location: [facility];  Service: Orthopedics   • SHOULDER ARTHROSCOPY      X2   • CRUZ ARTHROPLASTY     • TOTAL KNEE ARTHROPLASTY Bilateral      Family History   Problem Relation Age of Onset   • Depression Mother    • Stroke Father    • Heart disease Father    • Diabetes Other       Social History     Social History   • Marital status:      Spouse name: N/A   • Number of children: N/A   • Years of education: N/A     Occupational History   • Not on file.     Social History Main Topics   • Smoking status: Never Smoker   • Smokeless tobacco: Never Used   • Alcohol use No   • Drug use: No   • Sexual activity: Defer     Other Topics Concern   • Not on file     Social History Narrative   • No narrative on file        Review of Systems  "  Constitutional: Positive for activity change, appetite change, fatigue and unexpected weight change.   HENT: Positive for dental problem, hearing loss, nosebleeds and postnasal drip.    Eyes: Positive for visual disturbance.   Respiratory: Positive for apnea.    Cardiovascular: Positive for leg swelling.   Gastrointestinal: Negative.    Endocrine: Positive for cold intolerance.   Genitourinary: Negative.    Musculoskeletal: Positive for back pain, gait problem and joint swelling.   Skin: Negative.    Allergic/Immunologic: Negative.    Neurological: Positive for dizziness, speech difficulty and numbness.   Hematological: Bruises/bleeds easily.   Psychiatric/Behavioral: Positive for decreased concentration.       The following portions of the patient's history were reviewed and updated as appropriate: allergies, current medications, past family history, past medical history, past social history, past surgical history and problem list.      Objective      Physical Exam  /62   Pulse 60   Ht 161 cm (63.39\")   Wt 66.5 kg (146 lb 9.7 oz)   BMI 25.65 kg/m²     Body mass index is 25.65 kg/m².    GENERAL: Body habitus: normal weight for height    Lower extremity edema: Left: 1+ pitting; Right: 1+ pitting    Varicose veins:  Left: moderate; Right: moderate    Gait: using cane     Mental Status:  awake and alert; oriented to person, place, and time    Voice:  clear  SKIN:  Normal    Hair Growth:  Right:normal; Left:  normal  HEENT: Head: Normocephalic, atraumatic,  without obvious abnormality.  eye: normal external eye, no icterus   PULM:  Repiratory effort normal    Ortho Exam  Left Knee Exam  ----------  Knee Exam:  ----------  ALIGNMENT:  Left: neutral  ----------  RANGE OF MOTION:  Left: Normal (0-130 degrees) with no extensor lag or flexion contracture  LIGAMENTOUS STABILITY:   Left:stable to varus and valgus stress at terminal extension and 30 degrees without any evidence of laxity "   ----------  STRENGTH:  KNEE FLEXION  Left 4/5  KNEE EXTENSION Left 4/5  ----------  PAIN WITH PALPATION: Left denies tenderness to palpation about the knee  PAIN WITH KNEE ROM:  Left no  PATELLAR CREPITUS:  Left no  ----------  SENSATION TO LIGHT TOUCH:  DEEP PERONEAL/SUPERFICIAL PERONEAL/SURAL/SAPHENOUS/TIBIAL:   Left intact  ----------  EDEMA:   Left:  no  ERYTHEMA:  ; Left:  no  WOUNDS/INCISIONS: well healed anterior incision, no overlying skin problems.      Medical Decision Making    Data Review:   ordered and reviewed x-rays today    Assessment:  1. History of total left knee replacement        Plan:  Doing well status post revision left total knee replacement with Dr. leatha Colvin failure 2017.  I reviewed x-rays and clinical findings with the patient.  On exam she has good range of motion stable ligamentous exam.  X-rays so show well-positioned, cemented revision total knee arthroplasty with no evidence of osteolysis comes signs of fracture.  Patient reports no pain in the knee she has been pleased with the outcome.  Plan is continued observation.  She'll return in 2 years with repeat x-rays or sooner if needed.      Larisa Parker PA-C  04/10/18  1:51 PM

## 2018-12-03 ENCOUNTER — OFFICE (OUTPATIENT)
Dept: RURAL CLINIC 2 | Facility: CLINIC | Age: 83
End: 2018-12-03
Payer: OTHER GOVERNMENT

## 2018-12-03 VITALS — HEIGHT: 64 IN | SYSTOLIC BLOOD PRESSURE: 119 MMHG | DIASTOLIC BLOOD PRESSURE: 80 MMHG | WEIGHT: 149 LBS

## 2018-12-03 DIAGNOSIS — Z12.11 ENCOUNTER FOR SCREENING FOR MALIGNANT NEOPLASM OF COLON: ICD-10-CM

## 2018-12-03 DIAGNOSIS — K30 FUNCTIONAL DYSPEPSIA: ICD-10-CM

## 2018-12-03 PROCEDURE — 99204 OFFICE O/P NEW MOD 45 MIN: CPT | Performed by: NURSE PRACTITIONER

## 2019-01-14 ENCOUNTER — ON CAMPUS - OUTPATIENT (OUTPATIENT)
Dept: RURAL HOSPITAL 6 | Facility: HOSPITAL | Age: 84
End: 2019-01-14
Payer: OTHER GOVERNMENT

## 2019-01-14 DIAGNOSIS — K30 FUNCTIONAL DYSPEPSIA: ICD-10-CM

## 2019-01-14 DIAGNOSIS — K22.4 DYSKINESIA OF ESOPHAGUS: ICD-10-CM

## 2019-01-14 DIAGNOSIS — K44.9 DIAPHRAGMATIC HERNIA WITHOUT OBSTRUCTION OR GANGRENE: ICD-10-CM

## 2019-01-14 DIAGNOSIS — K29.50 UNSPECIFIED CHRONIC GASTRITIS WITHOUT BLEEDING: ICD-10-CM

## 2019-01-14 DIAGNOSIS — R10.13 EPIGASTRIC PAIN: ICD-10-CM

## 2019-01-14 PROCEDURE — 43239 EGD BIOPSY SINGLE/MULTIPLE: CPT | Mod: 59 | Performed by: INTERNAL MEDICINE

## 2019-01-14 PROCEDURE — 43249 ESOPH EGD DILATION <30 MM: CPT | Performed by: INTERNAL MEDICINE

## 2020-06-09 ENCOUNTER — OFFICE (OUTPATIENT)
Dept: URBAN - METROPOLITAN AREA CLINIC 4 | Facility: CLINIC | Age: 85
End: 2020-06-09
Payer: OTHER GOVERNMENT

## 2020-06-09 VITALS — HEIGHT: 64 IN

## 2020-06-09 DIAGNOSIS — K59.00 CONSTIPATION, UNSPECIFIED: ICD-10-CM

## 2020-06-09 DIAGNOSIS — R63.4 ABNORMAL WEIGHT LOSS: ICD-10-CM

## 2020-06-09 DIAGNOSIS — R19.4 CHANGE IN BOWEL HABIT: ICD-10-CM

## 2020-06-09 DIAGNOSIS — K30 FUNCTIONAL DYSPEPSIA: ICD-10-CM

## 2020-06-09 DIAGNOSIS — R63.0 ANOREXIA: ICD-10-CM

## 2020-06-09 PROCEDURE — 99213 OFFICE O/P EST LOW 20 MIN: CPT | Mod: 95 | Performed by: INTERNAL MEDICINE

## 2020-08-05 ENCOUNTER — OFFICE (OUTPATIENT)
Dept: URBAN - METROPOLITAN AREA CLINIC 4 | Facility: CLINIC | Age: 85
End: 2020-08-05
Payer: OTHER GOVERNMENT

## 2020-08-05 VITALS — HEIGHT: 64 IN

## 2020-08-05 DIAGNOSIS — K30 FUNCTIONAL DYSPEPSIA: ICD-10-CM

## 2020-08-05 DIAGNOSIS — K59.00 CONSTIPATION, UNSPECIFIED: ICD-10-CM

## 2020-08-05 PROCEDURE — 99213 OFFICE O/P EST LOW 20 MIN: CPT | Mod: 95 | Performed by: NURSE PRACTITIONER

## 2021-08-03 ENCOUNTER — OFFICE (OUTPATIENT)
Dept: URBAN - METROPOLITAN AREA CLINIC 4 | Facility: CLINIC | Age: 86
End: 2021-08-03
Payer: OTHER GOVERNMENT

## 2021-08-03 VITALS — HEIGHT: 64 IN | DIASTOLIC BLOOD PRESSURE: 76 MMHG | WEIGHT: 133 LBS | SYSTOLIC BLOOD PRESSURE: 137 MMHG

## 2021-08-03 DIAGNOSIS — K59.00 CONSTIPATION, UNSPECIFIED: ICD-10-CM

## 2021-08-03 DIAGNOSIS — K30 FUNCTIONAL DYSPEPSIA: ICD-10-CM

## 2021-08-03 DIAGNOSIS — K44.9 DIAPHRAGMATIC HERNIA WITHOUT OBSTRUCTION OR GANGRENE: ICD-10-CM

## 2021-08-03 PROCEDURE — 99214 OFFICE O/P EST MOD 30 MIN: CPT | Performed by: NURSE PRACTITIONER

## 2021-08-03 RX ORDER — OMEPRAZOLE 20 MG/1
CAPSULE, DELAYED RELEASE ORAL
Qty: 30 | Refills: 11 | Status: ACTIVE
Start: 2021-08-03

## (undated) DEVICE — DISH PETRI 3.5IN MD STRL LF

## (undated) DEVICE — BOWL UTIL STRL 32OZ

## (undated) DEVICE — ANTIBACTERIAL UNDYED BRAIDED (POLYGLACTIN 910), SYNTHETIC ABSORBABLE SUTURE: Brand: COATED VICRYL

## (undated) DEVICE — SIGMA LCS HIGH PERFORMANCE STERILE THREADED HEADED PINS: Brand: SIGMA LCS HIGH PERFORMANCE

## (undated) DEVICE — BANDAGE,GAUZE,BULKEE II,4.5"X4.1YD,STRL: Brand: MEDLINE

## (undated) DEVICE — 2108 SERIES SAGITTAL BLADE (9.1 X 0.64 X 35.2MM)

## (undated) DEVICE — NDL SPINE 22G 31/2IN BLK

## (undated) DEVICE — AIRWY 90MM NO9

## (undated) DEVICE — PK KN TOTL 10

## (undated) DEVICE — STRYKER PERFORMANCE SERIES SAGITTAL BLADE: Brand: STRYKER PERFORMANCE SERIES

## (undated) DEVICE — FRAZIER SUCTION INSTRUMENT 12 FR W/CONTROL VENT & OBTURATOR: Brand: FRAZIER

## (undated) DEVICE — PROXIMATE RH ROTATING HEAD SKIN STAPLERS (35 WIDE) CONTAINS 35 STAINLESS STEEL STAPLES: Brand: PROXIMATE

## (undated) DEVICE — INTENDED FOR TISSUE SEPARATION, AND OTHER PROCEDURES THAT REQUIRE A SHARP SURGICAL BLADE TO PUNCTURE OR CUT.: Brand: BARD-PARKER ® STAINLESS STEEL BLADES

## (undated) DEVICE — SIGMA LCS HIGH PERFORMANCE INSTRUMENTS STERILE THREADED PINS: Brand: SIGMA LCS HIGH PERFORMANCE

## (undated) DEVICE — MEDI-VAC NON-CONDUCTIVE SUCTION TUBING: Brand: CARDINAL HEALTH

## (undated) DEVICE — GAUZE,SPONGE,4"X4",16PLY,XRAY,STRL,LF: Brand: MEDLINE

## (undated) DEVICE — ST NERV BLCK CONT CONTIPLEX ECHO CLSD 18G 4IN

## (undated) DEVICE — RECIPROCATING BLADE, DOUBLE SIDED, OFFSET  (70.0 X 0.8 X 12.5MM)

## (undated) DEVICE — DRSNG WND BORDR/ADHS NONADHR/GZ LF 4X4IN STRL

## (undated) DEVICE — CANNULA,ADULT,SOFT-TOUCH,7TUBE,SC: Brand: MEDLINE

## (undated) DEVICE — SYS SKIN CLS DERMABOND PRINEO W/60CM MESH TP

## (undated) DEVICE — ENCORE® LATEX MICRO SIZE 8, STERILE LATEX POWDER-FREE SURGICAL GLOVE: Brand: ENCORE

## (undated) DEVICE — ADAPT ST INFUS ADMIN SYR 70IN

## (undated) DEVICE — SMARTSET HIGH PERFORMANCE MV MEDIUM VISCOSITY BONE CEMENT 40G
Type: IMPLANTABLE DEVICE | Site: KNEE | Status: NON-FUNCTIONAL
Brand: SMARTSET

## (undated) DEVICE — NERVE BLOCK SUPPORT KIT/BLUE: Brand: MEDLINE INDUSTRIES, INC.

## (undated) DEVICE — MEDI-VAC YANKAUER SUCTION HANDLE W/BULBOUS TIP: Brand: CARDINAL HEALTH

## (undated) DEVICE — PRECISION THIN (7.0 X 0.38 X 29.5MM)

## (undated) DEVICE — INTENDED USE FOR SURGICAL MARKING ON INTACT SKIN, ALSO PROVIDES A PERMANENT METHOD OF IDENTIFYING OBJECTS IN THE OPERATING ROOM: Brand: WRITESITE® REGULAR TIP SKIN MARKER

## (undated) DEVICE — 4.0MM OVAL CARBIDE BUR